# Patient Record
Sex: MALE | Race: WHITE | Employment: OTHER | URBAN - METROPOLITAN AREA
[De-identification: names, ages, dates, MRNs, and addresses within clinical notes are randomized per-mention and may not be internally consistent; named-entity substitution may affect disease eponyms.]

---

## 2019-08-07 ENCOUNTER — HOSPITAL ENCOUNTER (EMERGENCY)
Facility: CLINIC | Age: 59
Discharge: ANOTHER HOSPITAL | End: 2019-08-07
Attending: EMERGENCY MEDICINE | Admitting: EMERGENCY MEDICINE
Payer: COMMERCIAL

## 2019-08-07 ENCOUNTER — APPOINTMENT (OUTPATIENT)
Dept: GENERAL RADIOLOGY | Facility: CLINIC | Age: 59
End: 2019-08-07
Attending: EMERGENCY MEDICINE
Payer: COMMERCIAL

## 2019-08-07 ENCOUNTER — APPOINTMENT (OUTPATIENT)
Dept: CARDIOLOGY | Facility: CLINIC | Age: 59
DRG: 270 | End: 2019-08-07
Attending: INTERNAL MEDICINE
Payer: COMMERCIAL

## 2019-08-07 ENCOUNTER — SURGERY (OUTPATIENT)
Age: 59
End: 2019-08-07
Payer: COMMERCIAL

## 2019-08-07 ENCOUNTER — HOSPITAL ENCOUNTER (INPATIENT)
Facility: CLINIC | Age: 59
LOS: 3 days | Discharge: HOME | DRG: 270 | End: 2019-08-11
Admitting: INTERNAL MEDICINE
Payer: COMMERCIAL

## 2019-08-07 VITALS
DIASTOLIC BLOOD PRESSURE: 102 MMHG | TEMPERATURE: 97.8 F | WEIGHT: 210 LBS | SYSTOLIC BLOOD PRESSURE: 132 MMHG | HEART RATE: 82 BPM | RESPIRATION RATE: 39 BRPM | OXYGEN SATURATION: 100 %

## 2019-08-07 DIAGNOSIS — I21.3 ST ELEVATION MYOCARDIAL INFARCTION (STEMI), UNSPECIFIED ARTERY (H): ICD-10-CM

## 2019-08-07 DIAGNOSIS — R06.83 SNORING: ICD-10-CM

## 2019-08-07 DIAGNOSIS — I48.0 PAROXYSMAL ATRIAL FIBRILLATION (H): ICD-10-CM

## 2019-08-07 DIAGNOSIS — Z72.0 TOBACCO ABUSE: ICD-10-CM

## 2019-08-07 DIAGNOSIS — I21.9 ACUTE MYOCARDIAL INFARCTION, INITIAL EPISODE OF CARE (H): Primary | ICD-10-CM

## 2019-08-07 LAB
ANION GAP SERPL CALCULATED.3IONS-SCNC: 5 MMOL/L (ref 3–14)
APTT PPP: 25 SEC (ref 22–37)
BASOPHILS # BLD AUTO: 0.1 10E9/L (ref 0–0.2)
BASOPHILS NFR BLD AUTO: 0.4 %
BUN SERPL-MCNC: 26 MG/DL (ref 7–30)
CALCIUM SERPL-MCNC: 9.7 MG/DL (ref 8.5–10.1)
CHLORIDE SERPL-SCNC: 108 MMOL/L (ref 94–109)
CO2 SERPL-SCNC: 28 MMOL/L (ref 20–32)
CREAT SERPL-MCNC: 0.97 MG/DL (ref 0.66–1.25)
DIFFERENTIAL METHOD BLD: ABNORMAL
EOSINOPHIL # BLD AUTO: 0 10E9/L (ref 0–0.7)
EOSINOPHIL NFR BLD AUTO: 0.1 %
ERYTHROCYTE [DISTWIDTH] IN BLOOD BY AUTOMATED COUNT: 12.7 % (ref 10–15)
GFR SERPL CREATININE-BSD FRML MDRD: 84 ML/MIN/{1.73_M2}
GLUCOSE SERPL-MCNC: 106 MG/DL (ref 70–99)
HCT VFR BLD AUTO: 46.3 % (ref 40–53)
HGB BLD-MCNC: 16 G/DL (ref 13.3–17.7)
IMM GRANULOCYTES # BLD: 0 10E9/L (ref 0–0.4)
IMM GRANULOCYTES NFR BLD: 0.3 %
INR PPP: 1.08 (ref 0.86–1.14)
LYMPHOCYTES # BLD AUTO: 1.6 10E9/L (ref 0.8–5.3)
LYMPHOCYTES NFR BLD AUTO: 12.4 %
MCH RBC QN AUTO: 33.4 PG (ref 26.5–33)
MCHC RBC AUTO-ENTMCNC: 34.6 G/DL (ref 31.5–36.5)
MCV RBC AUTO: 97 FL (ref 78–100)
MONOCYTES # BLD AUTO: 0.8 10E9/L (ref 0–1.3)
MONOCYTES NFR BLD AUTO: 5.9 %
NEUTROPHILS # BLD AUTO: 10.3 10E9/L (ref 1.6–8.3)
NEUTROPHILS NFR BLD AUTO: 80.9 %
NRBC # BLD AUTO: 0 10*3/UL
NRBC BLD AUTO-RTO: 0 /100
PLATELET # BLD AUTO: 270 10E9/L (ref 150–450)
POTASSIUM SERPL-SCNC: 4.7 MMOL/L (ref 3.4–5.3)
RBC # BLD AUTO: 4.79 10E12/L (ref 4.4–5.9)
SODIUM SERPL-SCNC: 141 MMOL/L (ref 133–144)
TROPONIN I BLD-MCNC: 0.64 UG/L (ref 0–0.08)
TROPONIN I SERPL-MCNC: 1.48 UG/L (ref 0–0.04)
WBC # BLD AUTO: 12.7 10E9/L (ref 4–11)

## 2019-08-07 PROCEDURE — C9606 PERC D-E COR REVASC W AMI S: HCPCS | Mod: RC | Performed by: INTERNAL MEDICINE

## 2019-08-07 PROCEDURE — 25000128 H RX IP 250 OP 636: Performed by: INTERNAL MEDICINE

## 2019-08-07 PROCEDURE — 00000146 ZZHCL STATISTIC GLUCOSE BY METER IP

## 2019-08-07 PROCEDURE — 71045 X-RAY EXAM CHEST 1 VIEW: CPT

## 2019-08-07 PROCEDURE — 93005 ELECTROCARDIOGRAM TRACING: CPT

## 2019-08-07 PROCEDURE — 25000125 ZZHC RX 250: Performed by: INTERNAL MEDICINE

## 2019-08-07 PROCEDURE — 33967 INSERT I-AORT PERCUT DEVICE: CPT | Mod: 51 | Performed by: INTERNAL MEDICINE

## 2019-08-07 PROCEDURE — C1769 GUIDE WIRE: HCPCS | Performed by: INTERNAL MEDICINE

## 2019-08-07 PROCEDURE — 84484 ASSAY OF TROPONIN QUANT: CPT | Performed by: EMERGENCY MEDICINE

## 2019-08-07 PROCEDURE — 93458 L HRT ARTERY/VENTRICLE ANGIO: CPT | Mod: XU | Performed by: INTERNAL MEDICINE

## 2019-08-07 PROCEDURE — 99152 MOD SED SAME PHYS/QHP 5/>YRS: CPT | Performed by: INTERNAL MEDICINE

## 2019-08-07 PROCEDURE — 85730 THROMBOPLASTIN TIME PARTIAL: CPT | Performed by: EMERGENCY MEDICINE

## 2019-08-07 PROCEDURE — B2151ZZ FLUOROSCOPY OF LEFT HEART USING LOW OSMOLAR CONTRAST: ICD-10-PCS | Performed by: INTERNAL MEDICINE

## 2019-08-07 PROCEDURE — C1887 CATHETER, GUIDING: HCPCS | Performed by: INTERNAL MEDICINE

## 2019-08-07 PROCEDURE — 99291 CRITICAL CARE FIRST HOUR: CPT | Mod: 24 | Performed by: INTERNAL MEDICINE

## 2019-08-07 PROCEDURE — 33967 INSERT I-AORT PERCUT DEVICE: CPT | Performed by: INTERNAL MEDICINE

## 2019-08-07 PROCEDURE — 85025 COMPLETE CBC W/AUTO DIFF WBC: CPT | Performed by: EMERGENCY MEDICINE

## 2019-08-07 PROCEDURE — 85347 COAGULATION TIME ACTIVATED: CPT

## 2019-08-07 PROCEDURE — C1725 CATH, TRANSLUMIN NON-LASER: HCPCS | Performed by: INTERNAL MEDICINE

## 2019-08-07 PROCEDURE — 85610 PROTHROMBIN TIME: CPT | Performed by: EMERGENCY MEDICINE

## 2019-08-07 PROCEDURE — C1757 CATH, THROMBECTOMY/EMBOLECT: HCPCS | Performed by: INTERNAL MEDICINE

## 2019-08-07 PROCEDURE — 02C03ZZ EXTIRPATION OF MATTER FROM CORONARY ARTERY, ONE ARTERY, PERCUTANEOUS APPROACH: ICD-10-PCS | Performed by: INTERNAL MEDICINE

## 2019-08-07 PROCEDURE — 99285 EMERGENCY DEPT VISIT HI MDM: CPT | Mod: 25

## 2019-08-07 PROCEDURE — 25000132 ZZH RX MED GY IP 250 OP 250 PS 637: Performed by: EMERGENCY MEDICINE

## 2019-08-07 PROCEDURE — 25800030 ZZH RX IP 258 OP 636

## 2019-08-07 PROCEDURE — 93306 TTE W/DOPPLER COMPLETE: CPT | Mod: 26 | Performed by: INTERNAL MEDICINE

## 2019-08-07 PROCEDURE — B2111ZZ FLUOROSCOPY OF MULTIPLE CORONARY ARTERIES USING LOW OSMOLAR CONTRAST: ICD-10-PCS | Performed by: INTERNAL MEDICINE

## 2019-08-07 PROCEDURE — 27210794 ZZH OR GENERAL SUPPLY STERILE: Performed by: INTERNAL MEDICINE

## 2019-08-07 PROCEDURE — C1874 STENT, COATED/COV W/DEL SYS: HCPCS | Performed by: INTERNAL MEDICINE

## 2019-08-07 PROCEDURE — 25000128 H RX IP 250 OP 636: Performed by: EMERGENCY MEDICINE

## 2019-08-07 PROCEDURE — 99153 MOD SED SAME PHYS/QHP EA: CPT | Performed by: INTERNAL MEDICINE

## 2019-08-07 PROCEDURE — 80048 BASIC METABOLIC PNL TOTAL CA: CPT | Performed by: EMERGENCY MEDICINE

## 2019-08-07 PROCEDURE — 93458 L HRT ARTERY/VENTRICLE ANGIO: CPT | Mod: 26 | Performed by: INTERNAL MEDICINE

## 2019-08-07 PROCEDURE — 25800030 ZZH RX IP 258 OP 636: Performed by: INTERNAL MEDICINE

## 2019-08-07 PROCEDURE — 84484 ASSAY OF TROPONIN QUANT: CPT

## 2019-08-07 PROCEDURE — 92941 PRQ TRLML REVSC TOT OCCL AMI: CPT | Mod: RC | Performed by: INTERNAL MEDICINE

## 2019-08-07 PROCEDURE — 25000125 ZZHC RX 250

## 2019-08-07 PROCEDURE — 4A023N7 MEASUREMENT OF CARDIAC SAMPLING AND PRESSURE, LEFT HEART, PERCUTANEOUS APPROACH: ICD-10-PCS | Performed by: INTERNAL MEDICINE

## 2019-08-07 PROCEDURE — 5A02210 ASSISTANCE WITH CARDIAC OUTPUT USING BALLOON PUMP, CONTINUOUS: ICD-10-PCS | Performed by: INTERNAL MEDICINE

## 2019-08-07 PROCEDURE — 027035Z DILATION OF CORONARY ARTERY, ONE ARTERY WITH TWO DRUG-ELUTING INTRALUMINAL DEVICES, PERCUTANEOUS APPROACH: ICD-10-PCS | Performed by: INTERNAL MEDICINE

## 2019-08-07 PROCEDURE — 96365 THER/PROPH/DIAG IV INF INIT: CPT

## 2019-08-07 DEVICE — STENT RESOLUTE ONYX DE 2.7FR 3.50X38MM RONYX DE35038UX: Type: IMPLANTABLE DEVICE | Status: FUNCTIONAL

## 2019-08-07 DEVICE — STENT RESOLUTE ONYX DE 2.7FR 3.50X18MM RONYX DE35018UX: Type: IMPLANTABLE DEVICE | Status: FUNCTIONAL

## 2019-08-07 RX ORDER — ATROPINE SULFATE 0.1 MG/ML
INJECTION INTRAVENOUS
Status: DISCONTINUED | OUTPATIENT
Start: 2019-08-07 | End: 2019-08-07 | Stop reason: HOSPADM

## 2019-08-07 RX ORDER — NITROGLYCERIN 5 MG/ML
VIAL (ML) INTRAVENOUS
Status: DISCONTINUED | OUTPATIENT
Start: 2019-08-07 | End: 2019-08-07 | Stop reason: HOSPADM

## 2019-08-07 RX ORDER — FENTANYL CITRATE 50 UG/ML
INJECTION, SOLUTION INTRAMUSCULAR; INTRAVENOUS
Status: DISCONTINUED | OUTPATIENT
Start: 2019-08-07 | End: 2019-08-07 | Stop reason: HOSPADM

## 2019-08-07 RX ORDER — NOREPINEPHRINE BITARTRATE 0.06 MG/ML
0.03-0.4 INJECTION, SOLUTION INTRAVENOUS CONTINUOUS
Status: DISCONTINUED | OUTPATIENT
Start: 2019-08-07 | End: 2019-08-08

## 2019-08-07 RX ORDER — ASPIRIN 81 MG/1
324 TABLET, CHEWABLE ORAL ONCE
Status: DISCONTINUED | OUTPATIENT
Start: 2019-08-07 | End: 2019-08-07

## 2019-08-07 RX ORDER — IOPAMIDOL 755 MG/ML
INJECTION, SOLUTION INTRAVASCULAR
Status: DISCONTINUED | OUTPATIENT
Start: 2019-08-07 | End: 2019-08-07 | Stop reason: HOSPADM

## 2019-08-07 RX ORDER — DOPAMINE HYDROCHLORIDE 160 MG/100ML
5-20 INJECTION, SOLUTION INTRAVENOUS CONTINUOUS
Status: DISCONTINUED | OUTPATIENT
Start: 2019-08-07 | End: 2019-08-10

## 2019-08-07 RX ORDER — HEPARIN SODIUM 1000 [USP'U]/ML
INJECTION, SOLUTION INTRAVENOUS; SUBCUTANEOUS
Status: DISCONTINUED | OUTPATIENT
Start: 2019-08-07 | End: 2019-08-07 | Stop reason: HOSPADM

## 2019-08-07 RX ORDER — NOREPINEPHRINE BITARTRATE 0.06 MG/ML
0.03-0.4 INJECTION, SOLUTION INTRAVENOUS CONTINUOUS
Status: DISCONTINUED | OUTPATIENT
Start: 2019-08-07 | End: 2019-08-10

## 2019-08-07 RX ORDER — ASPIRIN 81 MG/1
324 TABLET, CHEWABLE ORAL ONCE
Status: COMPLETED | OUTPATIENT
Start: 2019-08-07 | End: 2019-08-07

## 2019-08-07 RX ADMIN — FENTANYL CITRATE 50 MCG: 50 INJECTION, SOLUTION INTRAMUSCULAR; INTRAVENOUS at 21:51

## 2019-08-07 RX ADMIN — ASPIRIN 81 MG 324 MG: 81 TABLET ORAL at 20:56

## 2019-08-07 RX ADMIN — TICAGRELOR 180 MG: 90 TABLET ORAL at 21:04

## 2019-08-07 RX ADMIN — HEPARIN SODIUM 8000 UNITS: 1000 INJECTION, SOLUTION INTRAVENOUS; SUBCUTANEOUS at 22:00

## 2019-08-07 RX ADMIN — MIDAZOLAM 1 MG: 1 INJECTION INTRAMUSCULAR; INTRAVENOUS at 21:50

## 2019-08-07 RX ADMIN — HEPARIN SODIUM 3000 UNITS: 1000 INJECTION, SOLUTION INTRAVENOUS; SUBCUTANEOUS at 22:45

## 2019-08-07 RX ADMIN — NOREPINEPHRINE BITARTRATE 0.03 MCG/KG/MIN: 1 INJECTION INTRAVENOUS at 23:05

## 2019-08-07 RX ADMIN — FENTANYL CITRATE 50 MCG: 50 INJECTION, SOLUTION INTRAMUSCULAR; INTRAVENOUS at 21:43

## 2019-08-07 RX ADMIN — SODIUM CHLORIDE 1000 ML: 9 INJECTION, SOLUTION INTRAVENOUS at 22:23

## 2019-08-07 RX ADMIN — AMIODARONE HYDROCHLORIDE 150 MG: 1.5 INJECTION, SOLUTION INTRAVENOUS at 22:14

## 2019-08-07 RX ADMIN — SODIUM CHLORIDE 1000 ML: 9 INJECTION, SOLUTION INTRAVENOUS at 23:32

## 2019-08-07 RX ADMIN — ATROPINE SULFATE 0.5 MG: 0.1 INJECTION, SOLUTION ENDOTRACHEAL; INTRAMUSCULAR; INTRAVENOUS; SUBCUTANEOUS at 22:12

## 2019-08-07 RX ADMIN — MIDAZOLAM 1 MG: 1 INJECTION INTRAMUSCULAR; INTRAVENOUS at 21:43

## 2019-08-07 RX ADMIN — HEPARIN SODIUM 6500 UNITS: 1000 INJECTION INTRAVENOUS; SUBCUTANEOUS at 21:04

## 2019-08-07 RX ADMIN — LIDOCAINE HYDROCHLORIDE 9 ML: 10 INJECTION, SOLUTION EPIDURAL; INFILTRATION; INTRACAUDAL; PERINEURAL at 21:46

## 2019-08-07 RX ADMIN — AMIODARONE HYDROCHLORIDE 1 MG/MIN: 50 INJECTION, SOLUTION INTRAVENOUS at 22:51

## 2019-08-07 RX ADMIN — IOPAMIDOL 300 ML: 755 INJECTION, SOLUTION INTRAVENOUS at 23:29

## 2019-08-07 RX ADMIN — DOPAMINE HYDROCHLORIDE 5 MCG/KG/MIN: 160 INJECTION, SOLUTION INTRAVENOUS at 22:33

## 2019-08-07 RX ADMIN — MIDAZOLAM 1 MG: 1 INJECTION INTRAMUSCULAR; INTRAVENOUS at 21:57

## 2019-08-07 RX ADMIN — SODIUM CHLORIDE 1000 ML: 9 INJECTION, SOLUTION INTRAVENOUS at 22:52

## 2019-08-07 ASSESSMENT — ENCOUNTER SYMPTOMS: HEADACHES: 1

## 2019-08-07 NOTE — Clinical Note
Max pressure = 17 neri. Total duration = 20 seconds. Balloon reinflated a second time: Max pressure = 17 neri. Total duration = 17 seconds. Balloon reinflated a third time: Max pressure = 17 neri. Total duration = 15 seconds. Balloon reinflated a fourth time: Max pressure = 20 neri. Total duration = 15 seconds. Balloon reinflated a fifth time: Max pressure = 20 neri. Total duration = 17 seconds.

## 2019-08-07 NOTE — Clinical Note
The first balloon was inserted into the right coronary artery and middle right coronary artery.Max pressure = 14 neri. Total duration = 17 seconds.     Max pressure = 14 neri. Total duration = 16 seconds.    Balloon reinflated a second time: Max pressure = 14 neri. Total duration = 16 seconds.  Balloon reinflated a third time: Max pressure = 14 neri. Total duration = 40 seconds.

## 2019-08-07 NOTE — Clinical Note
Max pressure = 10 neri. Total duration = 35 seconds. Balloon reinflated a second time: Max pressure = 12 neri. Total duration = 30 seconds.

## 2019-08-07 NOTE — Clinical Note
Stent deployed in the middle right coronary artery. Max pressure = 9 neri. Total duration = 30 seconds. Balloon reinflated a second time: Max pressure = 10 neri. Total duration = 13 seconds.

## 2019-08-08 PROBLEM — R57.0 CARDIOGENIC SHOCK (H): Status: ACTIVE | Noted: 2019-08-08

## 2019-08-08 LAB
ANION GAP SERPL CALCULATED.3IONS-SCNC: 6 MMOL/L (ref 3–14)
BUN SERPL-MCNC: 21 MG/DL (ref 7–30)
CALCIUM SERPL-MCNC: 8.6 MG/DL (ref 8.5–10.1)
CATH EF ESTIMATED: 25 %
CHLORIDE SERPL-SCNC: 111 MMOL/L (ref 94–109)
CHOLEST SERPL-MCNC: 144 MG/DL
CO2 SERPL-SCNC: 24 MMOL/L (ref 20–32)
CREAT SERPL-MCNC: 0.89 MG/DL (ref 0.66–1.25)
ERYTHROCYTE [DISTWIDTH] IN BLOOD BY AUTOMATED COUNT: 13.3 % (ref 10–15)
GFR SERPL CREATININE-BSD FRML MDRD: >90 ML/MIN/{1.73_M2}
GLUCOSE BLDC GLUCOMTR-MCNC: 100 MG/DL (ref 70–99)
GLUCOSE BLDC GLUCOMTR-MCNC: 102 MG/DL (ref 70–99)
GLUCOSE BLDC GLUCOMTR-MCNC: 104 MG/DL (ref 70–99)
GLUCOSE BLDC GLUCOMTR-MCNC: 105 MG/DL (ref 70–99)
GLUCOSE SERPL-MCNC: 115 MG/DL (ref 70–99)
HCT VFR BLD AUTO: 40.3 % (ref 40–53)
HDLC SERPL-MCNC: 45 MG/DL
HGB BLD-MCNC: 13.7 G/DL (ref 13.3–17.7)
INTERPRETATION ECG - MUSE: NORMAL
KCT BLD-ACNC: 199 SEC (ref 75–150)
KCT BLD-ACNC: 255 SEC (ref 75–150)
KCT BLD-ACNC: 280 SEC (ref 75–150)
LDLC SERPL CALC-MCNC: 92 MG/DL
LMWH PPP CHRO-ACNC: 0.28 IU/ML
LMWH PPP CHRO-ACNC: 0.67 IU/ML
MAGNESIUM SERPL-MCNC: 2 MG/DL (ref 1.6–2.3)
MAGNESIUM SERPL-MCNC: 2 MG/DL (ref 1.6–2.3)
MCH RBC QN AUTO: 33.1 PG (ref 26.5–33)
MCHC RBC AUTO-ENTMCNC: 34 G/DL (ref 31.5–36.5)
MCV RBC AUTO: 97 FL (ref 78–100)
NONHDLC SERPL-MCNC: 99 MG/DL
PHOSPHATE SERPL-MCNC: 4.2 MG/DL (ref 2.5–4.5)
PLATELET # BLD AUTO: 218 10E9/L (ref 150–450)
POTASSIUM SERPL-SCNC: 4.6 MMOL/L (ref 3.4–5.3)
RBC # BLD AUTO: 4.14 10E12/L (ref 4.4–5.9)
SODIUM SERPL-SCNC: 141 MMOL/L (ref 133–144)
TRIGL SERPL-MCNC: 33 MG/DL
TROPONIN I SERPL-MCNC: 15.63 UG/L (ref 0–0.04)
TROPONIN I SERPL-MCNC: 54.76 UG/L (ref 0–0.04)
TROPONIN I SERPL-MCNC: 71.03 UG/L (ref 0–0.04)
TROPONIN I SERPL-MCNC: 85.98 UG/L (ref 0–0.04)
WBC # BLD AUTO: 12.8 10E9/L (ref 4–11)

## 2019-08-08 PROCEDURE — 80061 LIPID PANEL: CPT | Performed by: INTERNAL MEDICINE

## 2019-08-08 PROCEDURE — 84484 ASSAY OF TROPONIN QUANT: CPT | Performed by: NURSE PRACTITIONER

## 2019-08-08 PROCEDURE — 25000132 ZZH RX MED GY IP 250 OP 250 PS 637: Performed by: INTERNAL MEDICINE

## 2019-08-08 PROCEDURE — 25000128 H RX IP 250 OP 636: Performed by: INTERNAL MEDICINE

## 2019-08-08 PROCEDURE — 25800030 ZZH RX IP 258 OP 636: Performed by: INTERNAL MEDICINE

## 2019-08-08 PROCEDURE — 00000146 ZZHCL STATISTIC GLUCOSE BY METER IP

## 2019-08-08 PROCEDURE — 84100 ASSAY OF PHOSPHORUS: CPT | Performed by: INTERNAL MEDICINE

## 2019-08-08 PROCEDURE — 99291 CRITICAL CARE FIRST HOUR: CPT | Performed by: PHYSICIAN ASSISTANT

## 2019-08-08 PROCEDURE — 84484 ASSAY OF TROPONIN QUANT: CPT | Performed by: INTERNAL MEDICINE

## 2019-08-08 PROCEDURE — 85520 HEPARIN ASSAY: CPT | Performed by: INTERNAL MEDICINE

## 2019-08-08 PROCEDURE — 85027 COMPLETE CBC AUTOMATED: CPT | Performed by: INTERNAL MEDICINE

## 2019-08-08 PROCEDURE — 85520 HEPARIN ASSAY: CPT

## 2019-08-08 PROCEDURE — 93005 ELECTROCARDIOGRAM TRACING: CPT

## 2019-08-08 PROCEDURE — 83735 ASSAY OF MAGNESIUM: CPT | Performed by: INTERNAL MEDICINE

## 2019-08-08 PROCEDURE — 93010 ELECTROCARDIOGRAM REPORT: CPT | Performed by: INTERNAL MEDICINE

## 2019-08-08 PROCEDURE — 99207 ZZC NO CHARGE LOS: CPT | Performed by: INTERNAL MEDICINE

## 2019-08-08 PROCEDURE — 25000128 H RX IP 250 OP 636

## 2019-08-08 PROCEDURE — 99291 CRITICAL CARE FIRST HOUR: CPT | Performed by: INTERNAL MEDICINE

## 2019-08-08 PROCEDURE — 20000003 ZZH R&B ICU

## 2019-08-08 PROCEDURE — 80048 BASIC METABOLIC PNL TOTAL CA: CPT | Performed by: INTERNAL MEDICINE

## 2019-08-08 RX ORDER — ATORVASTATIN CALCIUM 40 MG/1
40 TABLET, FILM COATED ORAL DAILY
Status: DISCONTINUED | OUTPATIENT
Start: 2019-08-08 | End: 2019-08-09

## 2019-08-08 RX ORDER — MULTIPLE VITAMINS W/ MINERALS TAB 9MG-400MCG
1 TAB ORAL DAILY
COMMUNITY

## 2019-08-08 RX ORDER — CYCLOBENZAPRINE HCL 5 MG
5 TABLET ORAL AT BEDTIME
Status: COMPLETED | OUTPATIENT
Start: 2019-08-08 | End: 2019-08-08

## 2019-08-08 RX ORDER — ACETAMINOPHEN 325 MG/1
650 TABLET ORAL EVERY 4 HOURS PRN
Status: DISCONTINUED | OUTPATIENT
Start: 2019-08-08 | End: 2019-08-09

## 2019-08-08 RX ORDER — NALOXONE HYDROCHLORIDE 0.4 MG/ML
.1-.4 INJECTION, SOLUTION INTRAMUSCULAR; INTRAVENOUS; SUBCUTANEOUS
Status: DISCONTINUED | OUTPATIENT
Start: 2019-08-08 | End: 2019-08-11 | Stop reason: HOSPADM

## 2019-08-08 RX ORDER — FENTANYL CITRATE 50 UG/ML
25-50 INJECTION, SOLUTION INTRAMUSCULAR; INTRAVENOUS
Status: ACTIVE | OUTPATIENT
Start: 2019-08-08 | End: 2019-08-08

## 2019-08-08 RX ORDER — ATROPINE SULFATE 0.1 MG/ML
0.5 INJECTION INTRAVENOUS EVERY 5 MIN PRN
Status: ACTIVE | OUTPATIENT
Start: 2019-08-08 | End: 2019-08-08

## 2019-08-08 RX ORDER — ASPIRIN 81 MG/1
81 TABLET ORAL DAILY
Status: DISCONTINUED | OUTPATIENT
Start: 2019-08-08 | End: 2019-08-11 | Stop reason: HOSPADM

## 2019-08-08 RX ORDER — IBUPROFEN 200 MG
400-600 TABLET ORAL EVERY 4 HOURS PRN
Status: ON HOLD | COMMUNITY
End: 2019-08-10

## 2019-08-08 RX ORDER — HYDROCODONE BITARTRATE AND ACETAMINOPHEN 5; 325 MG/1; MG/1
1-2 TABLET ORAL EVERY 4 HOURS PRN
Status: DISCONTINUED | OUTPATIENT
Start: 2019-08-08 | End: 2019-08-11 | Stop reason: HOSPADM

## 2019-08-08 RX ORDER — SODIUM CHLORIDE 9 MG/ML
INJECTION, SOLUTION INTRAVENOUS CONTINUOUS
Status: DISCONTINUED | OUTPATIENT
Start: 2019-08-08 | End: 2019-08-10

## 2019-08-08 RX ORDER — SODIUM CHLORIDE 9 MG/ML
INJECTION, SOLUTION INTRAVENOUS CONTINUOUS
Status: ACTIVE | OUTPATIENT
Start: 2019-08-08 | End: 2019-08-08

## 2019-08-08 RX ORDER — NALOXONE HYDROCHLORIDE 0.4 MG/ML
.2-.4 INJECTION, SOLUTION INTRAMUSCULAR; INTRAVENOUS; SUBCUTANEOUS
Status: ACTIVE | OUTPATIENT
Start: 2019-08-08 | End: 2019-08-08

## 2019-08-08 RX ORDER — FLUMAZENIL 0.1 MG/ML
0.2 INJECTION, SOLUTION INTRAVENOUS
Status: ACTIVE | OUTPATIENT
Start: 2019-08-08 | End: 2019-08-08

## 2019-08-08 RX ORDER — NITROGLYCERIN 0.4 MG/1
0.4 TABLET SUBLINGUAL EVERY 5 MIN PRN
Status: DISCONTINUED | OUTPATIENT
Start: 2019-08-08 | End: 2019-08-11 | Stop reason: HOSPADM

## 2019-08-08 RX ADMIN — AMIODARONE HYDROCHLORIDE 0.5 MG/MIN: 50 INJECTION, SOLUTION INTRAVENOUS at 17:17

## 2019-08-08 RX ADMIN — HYDROCODONE BITARTRATE AND ACETAMINOPHEN 1 TABLET: 5; 325 TABLET ORAL at 00:33

## 2019-08-08 RX ADMIN — ASPIRIN 81 MG: 81 TABLET, COATED ORAL at 09:16

## 2019-08-08 RX ADMIN — HEPARIN SODIUM 1050 UNITS/HR: 10000 INJECTION, SOLUTION INTRAVENOUS at 01:16

## 2019-08-08 RX ADMIN — ATORVASTATIN CALCIUM 40 MG: 40 TABLET, FILM COATED ORAL at 09:16

## 2019-08-08 RX ADMIN — TICAGRELOR 90 MG: 90 TABLET ORAL at 20:38

## 2019-08-08 RX ADMIN — HYDROCODONE BITARTRATE AND ACETAMINOPHEN 1 TABLET: 5; 325 TABLET ORAL at 01:51

## 2019-08-08 RX ADMIN — HYDROCODONE BITARTRATE AND ACETAMINOPHEN 1 TABLET: 5; 325 TABLET ORAL at 19:30

## 2019-08-08 RX ADMIN — HYDROCODONE BITARTRATE AND ACETAMINOPHEN 1 TABLET: 5; 325 TABLET ORAL at 18:41

## 2019-08-08 RX ADMIN — HYDROCODONE BITARTRATE AND ACETAMINOPHEN 1 TABLET: 5; 325 TABLET ORAL at 22:52

## 2019-08-08 RX ADMIN — CYCLOBENZAPRINE HYDROCHLORIDE 5 MG: 5 TABLET, FILM COATED ORAL at 23:58

## 2019-08-08 RX ADMIN — SODIUM CHLORIDE: 9 INJECTION, SOLUTION INTRAVENOUS at 01:17

## 2019-08-08 RX ADMIN — AMIODARONE HYDROCHLORIDE 0.5 MG/MIN: 50 INJECTION, SOLUTION INTRAVENOUS at 09:42

## 2019-08-08 RX ADMIN — HYDROCODONE BITARTRATE AND ACETAMINOPHEN 1 TABLET: 5; 325 TABLET ORAL at 13:39

## 2019-08-08 RX ADMIN — AMIODARONE HYDROCHLORIDE 150 MG: 1.5 INJECTION, SOLUTION INTRAVENOUS at 00:32

## 2019-08-08 RX ADMIN — HYDROCODONE BITARTRATE AND ACETAMINOPHEN 1 TABLET: 5; 325 TABLET ORAL at 05:14

## 2019-08-08 ASSESSMENT — ACTIVITIES OF DAILY LIVING (ADL)
ADLS_ACUITY_SCORE: 11
ADLS_ACUITY_SCORE: 11
ADLS_ACUITY_SCORE: 17
ADLS_ACUITY_SCORE: 11
ADLS_ACUITY_SCORE: 11

## 2019-08-08 NOTE — PHARMACY-ADMISSION MEDICATION HISTORY
Admission medication history interview status for the 8/7/2019  admission is complete. See EPIC admission navigator for prior to admission medications     Medication history source reliability:Good    Actions taken by pharmacist (provider contacted, etc):None     Additional medication history information not noted on PTA med list :None    Medication reconciliation/reorder completed by provider prior to medication history? No    Time spent in this activity: 5    Prior to Admission medications    Medication Sig Last Dose Taking? Auth Provider   ibuprofen (ADVIL/MOTRIN) 200 MG tablet Take 400-600 mg by mouth every 4 hours as needed for mild pain 8/7/2019 at Unknown time Yes Unknown, Entered By History   multivitamin w/minerals (MULTI-VITAMIN) tablet Take 1 tablet by mouth daily 8/7/2019 at Unknown time Yes Unknown, Entered By History

## 2019-08-08 NOTE — PROGRESS NOTES
SURGICAL ICU PROGRESS NOTE  August 8, 2019      ASSESSMENT: Gilbert Dawson is a 58 year old male with history congenital heart disease, pulmonary stenosis and VSD that presented with angina and was found to have an acute STEMI.  He was emergently take to angio for thrombectomy and stenting in two locations.  He was referred to ICU for cardiogenic shock.      PLAN:  Neuro/ pain/ sedation:  - Pain well-controlled and minimal.  Anginal pain is resolved.    - Fentanyl, dilaudid prn.      Pulmonary care:   - Supplemental oxygen to keep saturation above 92 %.  - Incentive spirometer every 15- 30 minutes when awake.    Cardiovascular:    - STEMI s/p stent X 2 currently on IABP.    - Cardiogenic shock-  - Atrial fibrillation in the setting STEMI  - Ischemic cardiomyopathy with EF of 30%.    - Vtach in the setting STEMI, intermittent/asymptomatic.  - Cardiac meds per Cardiology (statin, aspirin, ticagrelor, heparin)  - Continue pressor support.  - Amiodarone drip.    - Serial EKGs per Cardiology.  - Repeat echos per Cardiology.    - Monitor hemodynamic status.   - optimize electrolytes as needed.      GI care/Nutrition:  - Clear liquids.        Renal/ Fluid Balance/Electrolytes:    - Urine output is adequate so far.  - Will continue to monitor intake and output.    Endocrine:    - No management indication.   - Sliding scale for diabetes management.     ID/ Antibiotics:  - No indication for antibiotics.     Heme:     - Hemoglobin stable. Minimal intraoperative blood loss.    - Monitor H/H.      Prophylaxis:    - On heparin drip.    - No GI prophylaxis needed.      Lines/ tubes/ drains:  - PIV  - Right groin procedural access    Disposition:  - [facility] ICU    Patient seen, findings and plan discussed with surgical ICU staff, Dr. Kelsey.  Critical care time: 50 minutes    Valentina Hughes PA-C    ====================================    SUBJECTIVE:   Patient states he is tired but feeling well.  He denies any shortness of  breath.  His anginal symptoms have resolved and he has minimal discomfort at his right femoral site.  He is mostly bothered by having to lay flat and move minimally.  10 point ROS otherwise negative.      OBJECTIVE:   1. VITAL SIGNS:   Temp:  [97.8  F (36.6  C)-97.9  F (36.6  C)] 97.9  F (36.6  C)  Pulse:  [66-95] 80  Heart Rate:  [71-99] 75  Resp:  [9-39] 16  BP: ()/() 109/86  SpO2:  [88 %-100 %] 99 %  Resp: 16      2. INTAKE/ OUTPUT:   I/O last 3 completed shifts:  In: 1228.13 [P.O.:200; I.V.:1028.13]  Out: 825 [Urine:825]    3. PHYSICAL EXAMINATION:     GEN: NAD, lying comfortably in bed.    EYES: PERRL, Anicteric sclera.   HEENT:  Normocephalic, atraumatic, trachea midline  CV: IRIR, no gallops, rubs, or murmurs  PULM/CHEST: Clear breath sounds bilaterally without rhonchi, crackles or wheeze, symmetric chest rise  GI: normal bowel sounds, soft, non-tender, no rebound tenderness or guarding, no masses  : No hancock.  EXTREMITIES: No peripheral edema, moving all extremities, peripheral pulses intact  NEURO: Cranial nerves II-XII grossly intact, no motor-sensory deficits noted  SKIN: No rashes, sores or ulcerations  PSYCH:  Affect: appropriate    Imaging personally reviewed:  ECG  4. INVESTIGATIONS:   Arterial Blood Gases   No lab results found in last 7 days.  Complete Blood Count   Recent Labs   Lab 08/08/19 0600 08/07/19  2100   WBC 12.8* 12.7*   HGB 13.7 16.0    270     Basic Metabolic Panel  Recent Labs   Lab 08/08/19  0600 08/07/19  2100    141   POTASSIUM 4.6 4.7   CHLORIDE 111* 108   CO2 24 28   BUN 21 26   CR 0.89 0.97   * 106*     Liver Function Tests  Recent Labs   Lab 08/07/19  2100   INR 1.08     Pancreatic Enzymes  No lab results found in last 7 days.  Coagulation Profile  Recent Labs   Lab 08/07/19  2100   INR 1.08   PTT 25         5. RADIOLOGY:   Recent Results (from the past 24 hour(s))   XR Chest Port 1 View    Narrative    EXAM: XR CHEST PORT 1 VW  LOCATION:  [facility]  DATE/TIME: 8/7/2019 8:57 PM    INDICATION: Myocardial infarction  COMPARISON: None    FINDINGS: Negative chest.       =========================================

## 2019-08-08 NOTE — CONSULTS
Critical Care  Note      08/08/2019    Name: Gilbert Dawson MRN#: [id number]   Age: 58 year old YOB: 1960     \A [facility]ology of [place] [facility]\""tl Day# 0  ICU DAY #    MV DAY #             Problem List:   Active Problems:    Cardiogenic shock (H)  1. Acute MI- per cardiology/Dr. Leong this was his RCA (now with stents), and toring closely, and also supported with IABP.  2. Cardiogenic shock, slowly recovering with support  (especially IABP)- he is now slowly coming off of support (including levophed) though still on some amiodarone to prevent vtach.   3. History of congenital heart disease- pulmonary stenosis and VSD  4. RBBB  Overall, acute and complicated, but compensated          Summary/Hospital Course:           Assessment and plan :     Gilbert Dawson IS a 58 year old male admitted on 8/7/2019 for acute MI .   I have personally reviewed the daily labs, imaging studies, cultures and discussed the case with referring physician and consulting physicians.     My assessment and plan by system for this patient is as follows:    Neurology/Psychiatry:   1. Alert and cogent     Cardiovascular:   1.Hemodynamics - compensated though still with IABP and amiodarone    Pulmonary/Ventilator Management:   1. compnesated    GI and Nutrition :   1. PO's as able      Renal/Fluids/Electrolytes:   1. He remains on medications s    Infectious Disease:   1. No issues at this time     Endocrine:   1. IV insulin  As needed    Hematology/Oncology:   1. Monitor only dd     IV/Access:   1. Venous access -   2. Arterial access -   3.  Plan  - central access required and necessary      ICU Prophylaxis:   1. DVT: Hep Subq/ LMWH/mechanical  2. VAP: HOB 30 degrees, chlorhexidine rinse  3. Stress Ulcer: PPI/H2 blocker  4. Restraints: Nonviolent soft two point restraints required and necessary for patient safety and continued cares and good effect as patient continues to pull at necessary lines, tubes despite education and distraction. Will readdress  daily.   5. Wound care  -   6. Feeding - PO's as able   7. Family Update: I discussed with wife   8. Disposition - continue ICU     Key goals for next 24 hours:   1. Support and stabilize  2. Titrate down medication support as able  3.               Medical History:     No past medical history on file.  No past surgical history on file.  Social History     Socioeconomic History     Marital status:      Spouse name: Not on file     Number of children: Not on file     Years of education: Not on file     Highest education level: Not on file   Occupational History     Not on file   Social Needs     Financial resource strain: Not on file     Food insecurity:     Worry: Not on file     Inability: Not on file     Transportation needs:     Medical: Not on file     Non-medical: Not on file   Tobacco Use     Smoking status: Not on file   Substance and Sexual Activity     Alcohol use: Not on file     Drug use: Not on file     Sexual activity: Not on file   Lifestyle     Physical activity:     Days per week: Not on file     Minutes per session: Not on file     Stress: Not on file   Relationships     Social connections:     Talks on phone: Not on file     Gets together: Not on file     Attends [employer] service: Not on file     Active member of club or organization: Not on file     Attends meetings of clubs or organizations: Not on file     Relationship status: Not on file     Intimate partner violence:     Fear of current or ex partner: Not on file     Emotionally abused: Not on file     Physically abused: Not on file     Forced sexual activity: Not on file   Other Topics Concern     Not on file   Social History Narrative     Not on file      No Known Allergies           Key Medications:       amiodarone  150 mg Intravenous Once     aspirin  81 mg Oral Daily     atorvastatin  40 mg Oral Daily     ticagrelor  90 mg Oral Q12H       amiodarone 1 mg/min (08/07/19 2251)     amiodarone       DOPamine Stopped (08/07/19 2246)      HEParin       norepinephrine       Percutaneous Coronary Intervention orders placed (this is information for BPA alerting)       ACE/ARB/ARNI NOT PRESCRIBED       BETA BLOCKER NOT PRESCRIBED       sodium chloride          Home Meds    Current Facility-Administered Medications on File Prior to Encounter:  [COMPLETED] aspirin (ASA) chewable tablet 324 mg   [COMPLETED] heparin (porcine) injection (LOADING DOSE)   [COMPLETED] ticagrelor (BRILINTA) tablet 180 mg     No current outpatient medications on file prior to encounter.           Physical Examination:   Temp:  [97.8  F (36.6  C)] 97.8  F (36.6  C)  Pulse:  [79-93] 93  Heart Rate:  [79-92] 86  Resp:  [9-39] 16  BP: (103-146)/() 110/79  SpO2:  [88 %-100 %] 100 %  No intake or output data in the 24 hours ending 08/08/19 0105  Wt Readings from Last 4 Encounters:   08/07/19 95.3 kg (210 lb)     BP - Mean:  [] 90  CVP:  [14 mmHg-15 mmHg] 14 mmHg  Resp: 16    No lab results found in last 7 days.    GEN: no acute distress; comfortable in bed with  IABP in place in    HEENT: head ncat, sclera anicteric, OP patent, trachea midline   PULM: unlabored synchronous with vent, clear anteriorly    CV/COR: RRR S1S2 no gallop,  No rub, no murmur  ABD: soft nontender, hypoactive bowel sounds, no mass  EXT:  Trace edema   warm  NEURO: grossly intact  SKIN: no obvious rash; no cyanosis or mottling; he has a good tan    LINES: clean, dry intact         Data:   All data and imaging reviewed     ROUTINE ICU LABS (Last four results)  CMP  Recent Labs   Lab 08/07/19  2100      POTASSIUM 4.7   CHLORIDE 108   CO2 28   ANIONGAP 5   *   BUN 26   CR 0.97   GFRESTIMATED 84   GFRESTBLACK >90   JOSE JUAN 9.7     CBC  Recent Labs   Lab 08/07/19  2100   WBC 12.7*   RBC 4.79   HGB 16.0   HCT 46.3   MCV 97   MCH 33.4*   MCHC 34.6   RDW 12.7        INR  Recent Labs   Lab 08/07/19  2100   INR 1.08     Arterial Blood GasNo lab results found in last 7 days.    All  cultures:  No results for input(s): CULT in the last 168 hours.  Recent Results (from the past 24 hour(s))   XR Chest Port 1 View    Narrative    EXAM: XR CHEST PORT 1 VW  LOCATION: [facility]  DATE/TIME: 8/7/2019 8:57 PM    INDICATION: Myocardial infarction  COMPARISON: None    FINDINGS: Negative chest.         MD Jane    Billing: This patient is critically ill: Yes. Total critical care time today 35 min.

## 2019-08-08 NOTE — CONSULTS
Consult Date:  08/08/2019      HISTORY OF PRESENT ILLNESS:  This is an Emergency Cardiac Consultation.  This patient is critically ill and his survival was questionable when I met him.  This dictation is being done post-angiogram and angioplasty.      The history initially presented was not correct.  The real history is that this patient has a history of congenital heart disease with a small muscular VSD and what is probably only mild to mildly- moderate pulmonary valve stenosis.  He had an echo in 2016 at, I believe [place], and he was supposed to follow up with a cardiologist after that, but he never did it.  Sometime in retrospect, around that time, he also had some chest pain, but he is a magallanes, so he passed it off.  He has been doctoring, for a year or more, for neck and shoulder pain and he has been diagnosed with some type of shoulder ailment, but I am a little suspicious that in retrospect might have been angina, although I do not have to admit, he states that the shoulder pain only occurs at rest, not when he is out and active.  In retrospect last night, however, he had more of that shoulder pain and went into his jaw and then into his chest and he was sweaty.  It went away and he assumed it was nothing, or it was just aches and pains from being a magallanes, and then today at 5:00 p.m., he had the same onset of symptoms, but this time, it did not go away.  He again thought it was just aches and pains from being a magallanes, so he did not present to [facility] for approximately 4 more hours.  At [facility], the EKG was markedly abnormal and he was transferred to our hospital.  On presentation, he is actively having pain.  He has marked EKG changes.  He was in sinus rhythm, but then had very, very frequent PACs, and then went into atrial fib before we did much, and then he started having multiple, multiple salvos of nonsustained V-tach.  We did an emergency angiogram and the reader is  referred to the angiogram report, but basically, what we found was the LAD was occluded at the origin and it looked chronic.  The diagonal was a very large vessel, occluded at the origin.  The circumflex had no more than mild to moderate disease.  The right coronary artery was the infarct acute vessel and it was thrombosed at 100% in the midportion.  It did have a conus branch LAD collateral and, as we found after we opened the RCA, the PDA collateralized the LAD also.  The RCA was severely diseased and calcified.  Again, as one reads the report what happened here, I am sure that he had a chronic LAD and diagonal occlusion and it may have been back to 4 years ago and again, as I suspect, some of that shoulder pain that has been doctoring for might have actually been his angina and then last night and then today, he closed off the right coronary completely and that provided collaterals to the LAD, so in foul swoop with 1 clot, he took off the inferior wall, the posterior wall, and the anterior wall, and a right ventricular infarction.  My initial plan was simply to balloon angioplasty the RCA and then possibly send him to surgery, but he received Brilinta and the post-balloon result was unstable.  We did thrombectomy, and I ended up stenting in 2 spots.  He had blood pressures primarily 60 systolic for much of the case, and he was in AFib for the entire case, but multiple spindles of V-tach.  We had him on low-dose dopamine, which helped his blood pressure, but the V-tach was worse.  We eventually switched it off to Levophed at a lower dose, and put an intraaortic balloon pump in.  When he left the lab, he was augmenting in the 90s.  He was in atrial fib, was only minimal.  Repeated the EKG, although still abnormal, was markedly improved from the pre-.  His arm and shoulder pain were getting distinctly better.  An emergency echo shows relatively preserved RV function, though evidence of an RV infarct is seen.  We do  not clearly see a VSD.  The LV function, roughly, is in the 30% range with anterior, inferior and posterior wall motion abnormalities.  No clear MR was seen.      PAST MEDICAL HISTORY:   1.  Cardiac as above, even though he initially told the ER doctor that he did not have any heart problems, per se.   2.  Smoking history, ongoing tobacco use.   3.  Neck, lumbar region pain, for which he saw a chiropractor recently.   4.  Left shoulder pain, for which she saw Dr. LuisA Aquino.   5.  Remote history of cellulitis of the face.   6.  VSD, pulmonary stenosis as above.   7.  Right bundle branch block.      SOCIAL HISTORY:  He is .  He is a magallanes.  He smokes a half a pack of cigarettes per day.  He has 4-5 beers per week, mostly weekend.  He has mild caffeine intake.      FAMILY HISTORY:  Father  with Alzheimer's disease.  Mother is still alive in her 90s.  There is no coronary disease in the family that he is aware of.      REVIEW OF SYSTEMS:     MUSCULOSKELETAL:  Shoulder as above, as an orthopedic problem.     PULMONARY:  No known lung disease.   CARDIAC:  As above.   GASTROINTESTINAL:  Negative.   NEUROLOGIC:  Negative for stroke.   ENDOCRINE:  Negative.   Remainder review of systems negative.      PHYSICAL EXAMINATION:   GENERAL:  Reveals a patient who is in cardiogenic shock on presentation.  Essentially, it was in rapid AFib with multiple spindles of V-tach.  Blood pressure 60 systolic.  He was in extremis condition on presentation.   SKIN:  A bit mottled, sweaty.   HEENT:  Nonicteric.   NECK:  Supple without thyromegaly.   CHEST:  A few crackles.  This exam was done very cursory on the Cath Lab table with a few crackles anteriorly and laterally.   CARDIAC:  Revealed frequent ectopy.  There was a soft systolic murmur.  There was either a click or I am hearing a pulmonary valve.  Again, it was a very quick exam and I am wondering if I am hearing a right bundle branch block and some pulmonary stenosis as  the click.  No rub.  Carotid pulses were only about 1+, femoral pulses about 1+.  Again, he is hypotensive during the exam.   ABDOMEN:  Flat.  There is no organomegaly.   EXTREMITIES:  No cyanosis, clubbing or edema.   NEUROLOGIC:  He is alert and oriented.  Cranial nerves II-XII are intact.  Motor and sensory intact.      LABORATORY DATA:  Much of which came back after we started the case:  Sodium 141, potassium 4.7, creatinine 0.97.  Troponin #1 is positive at 1.475.  Glucose 106.  White count 12.7, hemoglobin 16.0.  I do not know if that is contraction alkalosis or from the VSD, but they said the VSD was left to right and small, so it is probably not from shunting right to left, but hypoxia.  MCV is 97, platelet count 270,000.  INR 1.08.        Chest x-ray:  No pulmonary findings.      EKG #1, from [facility]:  Showed a sinus rhythm with the start of Q-waves in lead III and F, marked ST elevation in leads II, III and F, mild ST elevation in V6, marked ST depression in I, L, and V2 through V4, with a slight incomplete right bundle branch block.  There may have been, at most, trivial ST elevation in lead V1 which would perhaps go along with probably the RV infarct.      The reader is referred to the heart catheterization and as above, but basically this is a patient, as I alluded to who, in retrospect, has had a chronic LAD and diagonal occlusion, perhaps going back a few years.  I am wondering if some of his orthopedic shoulder issues are really angina.  He closed off his right coronary artery last night and then today, and since it collateralized the LAD, it took out the anterior wall and the inferior and posterior wall.  He is in cardiogenic shock but getting better.  He is in atrial fib now.  We will keep him on the IV amiodarone, which I started because of the AFib and the V-tach.  I will keep him on the low-dose Levophed, a balloon pump is in place.  We ended up stenting the right coronary artery, so he  "will need to be on Brilinta.  My ultimate plan is, if he survives and makes good recovery, we should do a viability study of the anterior wall and perhaps wait a little bit for recovery and then consider going to bypass grafting of the LAD and the large diagonal.  I do not know that the circumflex is severe enough, and the right coronary artery, assuming the stents stay patent, will not need to be bypassed.  Of course, the issue is going to be going to bypass surgery, if it is within 6 months while he is still on Plavix or similar medicine.  The RV branch is occluded and there was an RV infarct and no doubt that is contributing to a little bit of the hypotension, although again, the echo is rather reassuring and in the setting of pulmonary valve stenosis and an RV infarct, one would expect some pressure volume overload of the right ventricle and I suspect that this is \"the double whammy\" of an RV infarct and pulmonary stenosis.  I do not think the VSD is an issue, in fact, on the LV gram  I did not even see it.  Although, some of that could be because of RV pressure probably is higher today and it may have balanced out and that is why we do not see shunting from the LV gram.  We will check cholesterols.  At this point, he cannot get beta blockers or ACE inhibitors, because he is in cardiogenic shock, but slowly getting better.         RIVERA ARREGUIN MD             D: 2019   T: 2019   MT: SULTANA      Name:     ENRIQUETA KLEIN   MRN:      [id number]        Account:       [id number]   :      1960           Consult Date:  2019      Document: [id number]       cc: Noe Rodney MD   "

## 2019-08-08 NOTE — PLAN OF CARE
Heart rhythm incredibly varied overnight with self-limiting runs of V-tach, SVT, Afib RVR, etc.  Pt never symptomatic and returned to baseline of Afib- CVR with PVCs/PACs.  Blood pressure stable with balloon pump, amiodarone gtt, and Levophed 0.03 mcg/kg/min continuous overnight.  Balloon pump stably in place overnight without issue, site without hematoma/bruit, pulses intact.  Pain in ear radiating to jaw continued and was controlled to within tolerable limits with Norco, three tabs given overnight.  Continues to maintain O2 saturations on 2 L NC.  Pt alert, calm, and cooperative overnight.  Pt is still taking time to accept the severity of this event.  Wife updated at bedside and by phone this morning.  Will continue to monitor.

## 2019-08-08 NOTE — CONSULTS
Cardiovascular Surgery Consultation     Asked to see this patient in consultation for possible surgical treatment. Primary care physician is Dr. Bharat Worley and cardiologist is Dr. Leong.           Chief Complaint:   Chest/shoulder pain.    History is obtained from the patient and wife.         History of Present Illness:   Mr. Dawson is a 58 year old male with a history of small muscular VSD (Qp/Qs 1.3)  and mild pulmonic valve stenosis (24mmHg MG)  who presented to ER with chest/shoulder/jaw pain.  He was found to have STEMI.  Pt was loaded with Brilanta and taken emergently to cath lab.  Pt was in cardiogenic shock.  He was found to have likely chronically occluded LAD/diagonal proximally with collateralization from the right.  Acute finding of RCA occlusion was noted.  Thrombectomy was performed and stentx2 which restored flow to right and left heart.  IABP was placed and pt transferred to ICU in shock but improving with vasopressor support.  This morning, he denies any chest pain and maintaining global perfusion with IABP, off vasopressors.    Left cath 8/7/2019:    The ejection fraction is 20-30% by visual estimate.    Left ventricular filling pressures are severely elevated .    Dist LM lesion is 20% stenosed.    Ost LAD lesion is 100% stenosed.    Ost 1st Diag lesion is 100% stenosed.    Mid Cx lesion is 45% stenosed.    Prox RCA lesion is 60% stenosed.    Mid RCA lesion is 100% stenosed.    Acute Mrg lesion is 100% stenosed.              Past Medical History:   I have reviewed this patient's past medical history  Diverticulosis without bleeding episodes          Past Surgical History:   This patient has no significant past surgical history            Social History:     Social History     Tobacco Use     Smoking status: Not on file   Substance Use Topics     Alcohol use: Not on file             Family History:   I have reviewed and updated this patient's family history          Immunizations:   No  immunizations have been given to this patient          Allergies:   All allergies reviewed and addressed          Medications:     No current outpatient medications on file.             Review of Systems:   Gen: denies frequent headaches, double/blurry vision, insomnia, fatigue, unexplained weight loss/gain. No previous anesthesia reactions.  CV: denies chest pain, shortness of breath, peripheral edema.  Pulm: denies shortness of breath, asthma or wheezing  GI/: denies liver or kidney problems, blood in stool or BRBPR, difficulty urinating  Endo: denies thyroid problems or Diabetes  Heme/Onc: denies bleeding or clotting disorders, no family problems with bleeding/clotting diorders  MS: no weakness, tremors or gait instability  Neuro: denies depression, memory problems, no dysesthesias, no previous strokes, no migraines, no dysphagia  Skin: No petechiae, purpura or rash.            Physical Exam:    B/P: 106/95, P: 77,    Wt Readings from Last 2 Encounters:   08/08/19 96.3 kg (212 lb 4.9 oz)   08/07/19 95.3 kg (210 lb)     General: NAD, comfortable in bed, wife at bedside  CV: atrial fibrillation, rate controlled, IABP in place  Pulm: breathing comfortably, no audible wheezing  GI: soft, ND  MS: moves all extremities x 4, 5+/5+ equal strength bilaterally  Neuro: pupils equal round and reactive to light, cranial nerves, II-XII grossly intact, no gross neurologic deficits noted         Data:        Lab Results   Component Value Date     08/08/2019    Lab Results   Component Value Date    CHLORIDE 111 08/08/2019    Lab Results   Component Value Date    BUN 21 08/08/2019      Lab Results   Component Value Date    POTASSIUM 4.6 08/08/2019    Lab Results   Component Value Date    CO2 24 08/08/2019    Lab Results   Component Value Date    CR 0.89 08/08/2019        Lab Results   Component Value Date    WBC 12.8 (H) 08/08/2019    HGB 13.7 08/08/2019    HCT 40.3 08/08/2019    MCV 97 08/08/2019     08/08/2019      Lab Results   Component Value Date    INR 1.08 08/07/2019            Assessment and Plan:   57 yo male with history of small muscular VSD (Qp/Qs 1.3) and mild pulmonic valve stenosis (24 mmHg MG) presented with acute STEMI and cardiogenic shock.  He is s/p emergent left heart cath with thrombectomy to RCA and stent x2.  Pt was loaded with Brilinta prior to intervention.  Decision to manage patient with PCI vs surgery due to risk of bleeding.  Discussed case with Dr. Leong and Dr. Piedra on call.  Fortunately, revascularization was successful with thrombectomy and stent placement which restored perfusion to inferior wall along with collateral blood flow to LAD territory.  Agree with optimal medical management and continuing anti-platelet therapy with plans for myocardial viability test in the future.  If viable, he would be a candidate for surgical revascularization to LAD and diag.  Will repeat ECHO as outpatient to reevaluate muscular VSD as well.      Will discuss with Dr. Moore.    Fior Casas  Cardiac Surgery Fellow  [phone]

## 2019-08-08 NOTE — ED TRIAGE NOTES
Patient presents with complaints of chest pain which started at 1700 tonight.Patient states it feel like heartburn. ABC intact without need for intervention at this time.

## 2019-08-08 NOTE — CONSULTS
NUTRITION CONSULT    Received standing order to educate patient on a Heart Healthy Diet post angiogram.    Will follow and complete assessment once patient appropriate and/or is transferred to medical unit.    Carolina Thompson RD, LD  Clinical Dietitian

## 2019-08-08 NOTE — ED PROVIDER NOTES
History     Chief Complaint:  Chest Pain    HPI  Gilbert Dawson is a 58 year old male who presents to the emergency department today for evaluation of chest pain. The patient reports he felt somewhat unwell last night, but did not think much of this as he frequently has aches and pains from his job. He states he felt a heartburn-like pain at that time. This afternoon around 1700, as the patient was outside working hard on a roof, he began to feel chest discomfort, jaw pain, and headache. The patient denies history of high blood pressure, diabetes, or high cholesterol.    Allergies:  Not allergic to any medications    Medications:    Medications reviewed. No pertinent medications.    Past Medical History:    Pulmonary valve stenosis  Pulmonary hypertension  VSD    Denies high blood pressure, diabetes, high cholesterol.    Past Surgical History:    Surgical history reviewed. No pertinent surgical history.    Family History:    Family history reviewed. No pertinent family history.    Social History:  The patient is accompanied by his wife. 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  Marital Status:      Review of Systems   HENT:        Positive for jaw pain.   Cardiovascular: Positive for chest pain (and discomfort).   Neurological: Positive for headaches.   All other systems reviewed and are negative.    Physical Exam     Patient Vitals for the past 24 hrs:   BP Temp Pulse Heart Rate Resp SpO2 Weight   08/07/19 2115 -- -- -- -- (!) 39 100 % --   08/07/19 2110 (!) 132/102 -- 82 92 10 100 % --   08/07/19 2105 (!) 131/100 -- 83 85 10 92 % --   08/07/19 2100 (!) 146/97 -- 85 80 10 100 % --   08/07/19 2056 (!) 130/97 97.8  F (36.6  C) 79 79 18 100 % --   08/07/19 2055 -- -- -- -- -- -- 95.3 kg (210 lb)     Physical Exam  General: Patient is alert and cooperative.  HENT:  Normal nose, oropharynx. Moist oral mucosa.  Eyes: EOMI. Normal conjunctiva.  Neck:  Normal range of motion and appearance.    Cardiovascular:  Normal rate, regular rhythm.   Pulmonary/Chest:  Effort normal. No wheezing or crackles. Speaking complete sentences.  Abdominal: Soft. No distension or tenderness.     Musculoskeletal: Normal range of motion. No edema or tenderness.   Neurological: oriented, normal strength, sensation, and coordination.   Skin: Warm and dry. No rash or bruising.   Psychiatric: Normal mood and affect. Normal behavior and judgement.      Emergency Department Course     ECG:  ECG taken at 2052, ECG read at 2105  Sinus rhythm with premature atrial complexes  Incomplete right bundle branch annamarie  ST elevation, consider inferior injury or acute infarct  ** ** ACUTE MI / STEMI ** **  Consider right ventricular involvement in acute inferior infarct  Rate 83 bpm. NV interval 174 ms. QRS duration 94 ms. QT/QTc 344/404 ms. P-R-T axes 51 80 100.    Imaging:  Radiology findings were communicated with the patient who voiced understanding of the findings.    XR Chest Port 1 View  Negative chest.    Reading per radiology     Laboratory:  Laboratory findings were communicated with the patient who voiced understanding of the findings.    INR: 1.08  Partial thromboplastin time: 25  CBC: WBC 12.7 (H), HGB 16.0,   BMP: Glucose 106 (H) o/w WNL (Creatinine 0.97)  Troponin I (Collected 2100): 1.475 ()  Troponin POCT (Collected 2059): 0.64 ()    Interventions:  2056 Aspirin 324 mg Oral  2104 Brilinta 180 mg Oral  2104 Heparin loading dose 6500 Units IV    Emergency Department Course:  2052 EKG obtained as noted above.  2054 Nursing notes and vitals reviewed.  2057 The patient was sent for a portable chest x-ray while in the emergency department, results above.   2059 I performed a physical examination of the patient as documented above.  2100 IV was inserted and blood was drawn for laboratory testing, results above.  2103 I spoke with Dr. Leong of the cardiology service from [facility] regarding patient's  presentation, findings, and plan of care.  2135 I again spoke with Dr. Leong.  2120 I personally reviewed the laboratory and imaging results with the patient and answered all related questions prior to transfer.    Impression & Plan      Critical Care time was  minutes for this patient excluding procedures.     Medical Decision Making:  Gilbert Dawson is a 58 year old male who presents to the emergency department today for evaluation of acute chest discomfort.  Triage EKG demonstrates sinus rhythm with ST segment elevations in the inferior leads with reciprocal ST depressions in leads I, aVL, and V1 through V4.  He is slightly hypertensive with a normal heart rate and oxygen saturations.  STEMI protocol was initiated.  He was medicated with aspirin, Brilinta, and a heparin bolus.  The interventional cardiologist was contacted and arrangements made for transfer to the coronary catheterization laboratory at [facility].    Diagnosis:    ICD-10-CM    1. ST elevation myocardial infarction (STEMI), unspecified artery (H) I21.3 CBC with platelets differential     INR     Partial thromboplastin time     Basic metabolic panel     Troponin I     Troponin POCT     Troponin POCT     Disposition:   The patient is transferred to the catheterization laboratory at [facility] for further evaluation and treatment under the care of Dr. Leong.    Scribe Disclosure:  I, Esteban Kimball, am serving as a scribe at 8:54 PM on 8/7/2019 to document services personally performed by Slava Cruz MD based on my observations and the provider's statements to me.    [facility] EMERGENCY DEPARTMENT       Slava Cruz MD  08/07/19 0515

## 2019-08-09 LAB
ANION GAP SERPL CALCULATED.3IONS-SCNC: 3 MMOL/L (ref 3–14)
BUN SERPL-MCNC: 17 MG/DL (ref 7–30)
CALCIUM SERPL-MCNC: 7.7 MG/DL (ref 8.5–10.1)
CHLORIDE SERPL-SCNC: 110 MMOL/L (ref 94–109)
CO2 SERPL-SCNC: 26 MMOL/L (ref 20–32)
CREAT SERPL-MCNC: 0.86 MG/DL (ref 0.66–1.25)
ERYTHROCYTE [DISTWIDTH] IN BLOOD BY AUTOMATED COUNT: 13.3 % (ref 10–15)
GFR SERPL CREATININE-BSD FRML MDRD: >90 ML/MIN/{1.73_M2}
GLUCOSE BLDC GLUCOMTR-MCNC: 103 MG/DL (ref 70–99)
GLUCOSE BLDC GLUCOMTR-MCNC: 113 MG/DL (ref 70–99)
GLUCOSE BLDC GLUCOMTR-MCNC: 88 MG/DL (ref 70–99)
GLUCOSE SERPL-MCNC: 106 MG/DL (ref 70–99)
HCT VFR BLD AUTO: 36.4 % (ref 40–53)
HGB BLD-MCNC: 12.4 G/DL (ref 13.3–17.7)
INTERPRETATION ECG - MUSE: NORMAL
KCT BLD-ACNC: 90 SEC (ref 75–150)
LMWH PPP CHRO-ACNC: 0.2 IU/ML
MAGNESIUM SERPL-MCNC: 1.7 MG/DL (ref 1.6–2.3)
MCH RBC QN AUTO: 33.3 PG (ref 26.5–33)
MCHC RBC AUTO-ENTMCNC: 34.1 G/DL (ref 31.5–36.5)
MCV RBC AUTO: 98 FL (ref 78–100)
PLATELET # BLD AUTO: 139 10E9/L (ref 150–450)
POTASSIUM SERPL-SCNC: 4.1 MMOL/L (ref 3.4–5.3)
RBC # BLD AUTO: 3.72 10E12/L (ref 4.4–5.9)
SODIUM SERPL-SCNC: 139 MMOL/L (ref 133–144)
TROPONIN I SERPL-MCNC: 39.96 UG/L (ref 0–0.04)
WBC # BLD AUTO: 11.3 10E9/L (ref 4–11)

## 2019-08-09 PROCEDURE — 25000132 ZZH RX MED GY IP 250 OP 250 PS 637: Performed by: NURSE PRACTITIONER

## 2019-08-09 PROCEDURE — 25800030 ZZH RX IP 258 OP 636: Performed by: NURSE PRACTITIONER

## 2019-08-09 PROCEDURE — 85520 HEPARIN ASSAY: CPT | Performed by: INTERNAL MEDICINE

## 2019-08-09 PROCEDURE — 85347 COAGULATION TIME ACTIVATED: CPT

## 2019-08-09 PROCEDURE — 93005 ELECTROCARDIOGRAM TRACING: CPT

## 2019-08-09 PROCEDURE — 00000146 ZZHCL STATISTIC GLUCOSE BY METER IP

## 2019-08-09 PROCEDURE — 85027 COMPLETE CBC AUTOMATED: CPT | Performed by: INTERNAL MEDICINE

## 2019-08-09 PROCEDURE — 20000003 ZZH R&B ICU

## 2019-08-09 PROCEDURE — 80048 BASIC METABOLIC PNL TOTAL CA: CPT | Performed by: INTERNAL MEDICINE

## 2019-08-09 PROCEDURE — 99233 SBSQ HOSP IP/OBS HIGH 50: CPT | Performed by: NURSE PRACTITIONER

## 2019-08-09 PROCEDURE — 25000128 H RX IP 250 OP 636: Performed by: INTERNAL MEDICINE

## 2019-08-09 PROCEDURE — 25000132 ZZH RX MED GY IP 250 OP 250 PS 637: Performed by: INTERNAL MEDICINE

## 2019-08-09 PROCEDURE — 83735 ASSAY OF MAGNESIUM: CPT | Performed by: INTERNAL MEDICINE

## 2019-08-09 PROCEDURE — 99233 SBSQ HOSP IP/OBS HIGH 50: CPT | Performed by: INTERNAL MEDICINE

## 2019-08-09 PROCEDURE — 25000128 H RX IP 250 OP 636: Performed by: NURSE PRACTITIONER

## 2019-08-09 PROCEDURE — 93010 ELECTROCARDIOGRAM REPORT: CPT | Performed by: INTERNAL MEDICINE

## 2019-08-09 PROCEDURE — 84484 ASSAY OF TROPONIN QUANT: CPT | Performed by: INTERNAL MEDICINE

## 2019-08-09 PROCEDURE — 25800030 ZZH RX IP 258 OP 636: Performed by: INTERNAL MEDICINE

## 2019-08-09 RX ORDER — CYCLOBENZAPRINE HCL 10 MG
10 TABLET ORAL 3 TIMES DAILY
Status: DISCONTINUED | OUTPATIENT
Start: 2019-08-09 | End: 2019-08-10

## 2019-08-09 RX ORDER — DIAZEPAM 10 MG/2ML
1 INJECTION, SOLUTION INTRAMUSCULAR; INTRAVENOUS ONCE
Status: COMPLETED | OUTPATIENT
Start: 2019-08-09 | End: 2019-08-09

## 2019-08-09 RX ORDER — POTASSIUM CHLORIDE 1500 MG/1
20-40 TABLET, EXTENDED RELEASE ORAL
Status: DISCONTINUED | OUTPATIENT
Start: 2019-08-09 | End: 2019-08-11 | Stop reason: HOSPADM

## 2019-08-09 RX ORDER — DIAZEPAM 2 MG
2 TABLET ORAL EVERY 8 HOURS PRN
Status: DISCONTINUED | OUTPATIENT
Start: 2019-08-09 | End: 2019-08-11

## 2019-08-09 RX ORDER — ACETAMINOPHEN 325 MG/1
975 TABLET ORAL EVERY 6 HOURS
Status: DISCONTINUED | OUTPATIENT
Start: 2019-08-09 | End: 2019-08-10

## 2019-08-09 RX ORDER — POTASSIUM CHLORIDE 1.5 G/1.58G
20-40 POWDER, FOR SOLUTION ORAL
Status: DISCONTINUED | OUTPATIENT
Start: 2019-08-09 | End: 2019-08-11 | Stop reason: HOSPADM

## 2019-08-09 RX ORDER — ATORVASTATIN CALCIUM 80 MG/1
80 TABLET, FILM COATED ORAL DAILY
Status: DISCONTINUED | OUTPATIENT
Start: 2019-08-10 | End: 2019-08-11 | Stop reason: HOSPADM

## 2019-08-09 RX ORDER — POTASSIUM CL/LIDO/0.9 % NACL 10MEQ/0.1L
10 INTRAVENOUS SOLUTION, PIGGYBACK (ML) INTRAVENOUS
Status: DISCONTINUED | OUTPATIENT
Start: 2019-08-09 | End: 2019-08-11 | Stop reason: HOSPADM

## 2019-08-09 RX ORDER — AMIODARONE HYDROCHLORIDE 200 MG/1
400 TABLET ORAL DAILY
Status: DISCONTINUED | OUTPATIENT
Start: 2019-08-09 | End: 2019-08-10

## 2019-08-09 RX ORDER — MAGNESIUM SULFATE HEPTAHYDRATE 40 MG/ML
4 INJECTION, SOLUTION INTRAVENOUS EVERY 4 HOURS PRN
Status: DISCONTINUED | OUTPATIENT
Start: 2019-08-09 | End: 2019-08-11 | Stop reason: HOSPADM

## 2019-08-09 RX ORDER — POTASSIUM CHLORIDE 29.8 MG/ML
20 INJECTION INTRAVENOUS
Status: DISCONTINUED | OUTPATIENT
Start: 2019-08-09 | End: 2019-08-11 | Stop reason: HOSPADM

## 2019-08-09 RX ORDER — POTASSIUM CHLORIDE 7.45 MG/ML
10 INJECTION INTRAVENOUS
Status: DISCONTINUED | OUTPATIENT
Start: 2019-08-09 | End: 2019-08-11 | Stop reason: HOSPADM

## 2019-08-09 RX ORDER — DIAZEPAM 10 MG/2ML
2.5 INJECTION, SOLUTION INTRAMUSCULAR; INTRAVENOUS ONCE
Status: COMPLETED | OUTPATIENT
Start: 2019-08-09 | End: 2019-08-09

## 2019-08-09 RX ORDER — MAGNESIUM SULFATE HEPTAHYDRATE 40 MG/ML
2 INJECTION, SOLUTION INTRAVENOUS DAILY PRN
Status: DISCONTINUED | OUTPATIENT
Start: 2019-08-09 | End: 2019-08-11 | Stop reason: HOSPADM

## 2019-08-09 RX ORDER — KETOROLAC TROMETHAMINE 15 MG/ML
15 INJECTION, SOLUTION INTRAMUSCULAR; INTRAVENOUS ONCE
Status: COMPLETED | OUTPATIENT
Start: 2019-08-09 | End: 2019-08-09

## 2019-08-09 RX ORDER — FENTANYL CITRATE 50 UG/ML
50 INJECTION, SOLUTION INTRAMUSCULAR; INTRAVENOUS ONCE
Status: COMPLETED | OUTPATIENT
Start: 2019-08-09 | End: 2019-08-09

## 2019-08-09 RX ORDER — LIDOCAINE 4 G/G
1-3 PATCH TOPICAL
Status: DISCONTINUED | OUTPATIENT
Start: 2019-08-09 | End: 2019-08-11

## 2019-08-09 RX ADMIN — DIAZEPAM 2 MG: 2 TABLET ORAL at 13:33

## 2019-08-09 RX ADMIN — CYCLOBENZAPRINE HYDROCHLORIDE 10 MG: 10 TABLET, FILM COATED ORAL at 08:27

## 2019-08-09 RX ADMIN — ATORVASTATIN CALCIUM 40 MG: 40 TABLET, FILM COATED ORAL at 08:27

## 2019-08-09 RX ADMIN — DIAZEPAM 1 MG: 5 INJECTION, SOLUTION INTRAMUSCULAR; INTRAVENOUS at 02:28

## 2019-08-09 RX ADMIN — KETOROLAC TROMETHAMINE 15 MG: 15 INJECTION, SOLUTION INTRAMUSCULAR; INTRAVENOUS at 14:30

## 2019-08-09 RX ADMIN — HEPARIN SODIUM 950 UNITS/HR: 10000 INJECTION, SOLUTION INTRAVENOUS at 00:32

## 2019-08-09 RX ADMIN — ACETAMINOPHEN 975 MG: 325 TABLET, FILM COATED ORAL at 14:30

## 2019-08-09 RX ADMIN — TICAGRELOR 90 MG: 90 TABLET ORAL at 20:16

## 2019-08-09 RX ADMIN — HYDROCODONE BITARTRATE AND ACETAMINOPHEN 1 TABLET: 5; 325 TABLET ORAL at 02:25

## 2019-08-09 RX ADMIN — LIDOCAINE 2 PATCH: 560 PATCH PERCUTANEOUS; TOPICAL; TRANSDERMAL at 08:38

## 2019-08-09 RX ADMIN — SODIUM CHLORIDE: 9 INJECTION, SOLUTION INTRAVENOUS at 15:23

## 2019-08-09 RX ADMIN — SODIUM CHLORIDE: 9 INJECTION, SOLUTION INTRAVENOUS at 00:34

## 2019-08-09 RX ADMIN — ASPIRIN 81 MG: 81 TABLET, COATED ORAL at 08:31

## 2019-08-09 RX ADMIN — SODIUM CHLORIDE: 9 INJECTION, SOLUTION INTRAVENOUS at 07:54

## 2019-08-09 RX ADMIN — MAGNESIUM SULFATE HEPTAHYDRATE 2 G: 40 INJECTION, SOLUTION INTRAVENOUS at 10:03

## 2019-08-09 RX ADMIN — AMIODARONE HYDROCHLORIDE 0.5 MG/MIN: 50 INJECTION, SOLUTION INTRAVENOUS at 02:20

## 2019-08-09 RX ADMIN — FENTANYL CITRATE 25 MCG: 50 INJECTION, SOLUTION INTRAMUSCULAR; INTRAVENOUS at 12:52

## 2019-08-09 RX ADMIN — TICAGRELOR 90 MG: 90 TABLET ORAL at 08:27

## 2019-08-09 RX ADMIN — METOPROLOL TARTRATE 12.5 MG: 25 TABLET, FILM COATED ORAL at 20:16

## 2019-08-09 RX ADMIN — FENTANYL CITRATE 25 MCG: 50 INJECTION, SOLUTION INTRAMUSCULAR; INTRAVENOUS at 12:32

## 2019-08-09 RX ADMIN — CYCLOBENZAPRINE HYDROCHLORIDE 10 MG: 10 TABLET, FILM COATED ORAL at 15:57

## 2019-08-09 RX ADMIN — DIAZEPAM 2.5 MG: 5 INJECTION, SOLUTION INTRAMUSCULAR; INTRAVENOUS at 12:38

## 2019-08-09 RX ADMIN — METOPROLOL TARTRATE 12.5 MG: 25 TABLET, FILM COATED ORAL at 14:31

## 2019-08-09 RX ADMIN — AMIODARONE HYDROCHLORIDE 400 MG: 200 TABLET ORAL at 09:14

## 2019-08-09 RX ADMIN — ACETAMINOPHEN 975 MG: 325 TABLET, FILM COATED ORAL at 08:28

## 2019-08-09 ASSESSMENT — ACTIVITIES OF DAILY LIVING (ADL)
ADLS_ACUITY_SCORE: 11

## 2019-08-09 ASSESSMENT — MIFFLIN-ST. JEOR: SCORE: 1821.88

## 2019-08-09 NOTE — PLAN OF CARE
[id number]  Pt complained mostly of lower back pain and L shoulder pain.  I: Tylenol, Flexoril, and Lidocaine patches and repositioned, backrubs  E: Slept soundly  CV: Cont with SR with frequent PAC's.  I: Oral Amiodarone given, IV Amiodarone discontinued at   Dr Spivey here.IABP can be removed.  IABP 1:2. Cath lab called back and Heparin disc.  ACT 1130.  When ACT < 180, will call to remove.    When IABP on standby, B/P /69-70's  1135 ACT 90.  Cath lab notified.  1230 Dr Tubbs here and disc venous sheath and IABP.  Manual pressure X 10 min followed by Femostop.  Pressure released gradually and off by 1340.  No hematoma, Remains tender to touch as prior to discontinuation.  See graphic for details.  Back pain remains 9 with almost constant spasms despite Fentanyl and Valium IV with discontinuing IABP,oral Valium, slight repositioning.  I: S Pals NP notified and Toradol given with Tylenol.  I: Pain decreased to a  7 by 1500.    Metoprolol held but B/P increased by 1430 so given.  PAC's decreased.  Wife updated by nurse.

## 2019-08-09 NOTE — PROGRESS NOTES
SURGICAL ICU PROGRESS NOTE  August 8, 2019      ASSESSMENT: Gilbert Dawson is a 58 year old male with history congenital heart disease, pulmonary stenosis and VSD that presented with angina and was found to have an acute STEMI.  He was emergently take to angio for thrombectomy and stenting in two locations.  He was referred to ICU for cardiogenic shock.     Plans for today:   -Continue supportive cares   -Add scheduled flexeril, PRN valium, and lidocaine patches   -Add electrolytes replacement protocol   -Defer to CV regarding IABP    PLAN:  Neuro/ pain/ sedation:  Acute low back pain   - Pain well-controlled and minimal.  Anginal pain is resolved.    - dilaudid, Valium, Norco  Prn.  - Scheduled flexeril   - Lidoderm patch    Pulmonary care:   - Supplemental oxygen to keep saturation above 92 %.  - Incentive spirometer every 15- 30 minutes when awake.    Cardiovascular:    - STEMI s/p stent X 2 currently on IABP.    - Cardiogenic shock-  - Atrial fibrillation in the setting STEMI  - Ischemic cardiomyopathy with EF of 30%.    - Vtach in the setting STEMI, intermittent/asymptomatic.  - Cardiac meds per Cardiology (statin, aspirin, ticagrelor, heparin)  - Currently off pressors   - Amiodarone drip.    - Serial EKGs per Cardiology.  - Repeat echos per Cardiology.    - Monitor hemodynamic status.   - optimize electrolytes as needed.      GI care/Nutrition:  - Clear liquids.        Renal/ Fluid Balance/Electrolytes:    - Urine output is adequate so far.  - Will continue to monitor intake and output.    Endocrine:    - No management indication.   - Sliding scale for diabetes management.     ID/ Antibiotics:  - No indication for antibiotics.     Heme:     - Hemoglobin stable. Minimal intraoperative blood loss.    - Monitor H/H.      Prophylaxis:    - On heparin drip.    - No GI prophylaxis needed.      Lines/ tubes/ drains:  - PIV  - Right groin procedural access    Disposition:  - [facility] ICU    Patient seen,  findings and plan discussed with surgical ICU staff, Dr. Kelsey.  Critical care time: 35 minutes    Enoch Kidd, NAVARRO     ====================================    SUBJECTIVE:   Worsening chronic right shoulder pain and low back pain. Otherwise ROS negative. No reports of V-tach overnight.       OBJECTIVE:   1. VITAL SIGNS:   Temp:  [98.3  F (36.8  C)-99  F (37.2  C)] 98.6  F (37  C)  Pulse:  [63-92] 77  Heart Rate:  [] 73  Resp:  [7-26] 11  BP: ()/() 128/80  SpO2:  [92 %-100 %] 100 %  Resp: 11      2. INTAKE/ OUTPUT:   I/O last 3 completed shifts:  In: 3778.28 [P.O.:1160; I.V.:2618.28]  Out: 925 [Urine:925]    3. PHYSICAL EXAMINATION:     GEN: NAD, lying comfortably in bed.    EYES: PERRL, Anicteric sclera.   HEENT:  Normocephalic, atraumatic, trachea midline  CV: IRIR, no gallops, rubs, or murmurs  PULM/CHEST: Clear breath sounds bilaterally without rhonchi, crackles or wheeze, symmetric chest rise  GI: normal bowel sounds, soft, non-tender, no rebound tenderness or guarding, no masses  : No hancock.  EXTREMITIES: No peripheral edema, moving all extremities, peripheral pulses intact  NEURO: Cranial nerves II-XII grossly intact, no motor-sensory deficits noted  SKIN: No rashes, sores or ulcerations  PSYCH:  Affect: appropriate    Imaging personally reviewed:  ECG  4. INVESTIGATIONS:   Arterial Blood Gases   No lab results found in last 7 days.  Complete Blood Count   Recent Labs   Lab 08/09/19  0400 08/08/19  0600 08/07/19  2100   WBC 11.3* 12.8* 12.7*   HGB 12.4* 13.7 16.0   * 218 270     Basic Metabolic Panel  Recent Labs   Lab 08/09/19  0400 08/08/19  0600 08/07/19  2100    141 141   POTASSIUM 4.1 4.6 4.7   CHLORIDE 110* 111* 108   CO2 26 24 28   BUN 17 21 26   CR 0.86 0.89 0.97   * 115* 106*     Liver Function Tests  Recent Labs   Lab 08/07/19  2100   INR 1.08     Pancreatic Enzymes  No lab results found in last 7 days.  Coagulation Profile  Recent Labs   Lab  08/07/19  2100   INR 1.08   PTT 25         5. RADIOLOGY:   No results found for this or any previous visit (from the past 24 hour(s)).    =========================================

## 2019-08-09 NOTE — CONSULTS
Medication coverage check for Brilinta. $162 monthly with coupon card.    Lennie Simons CphT  [facility] Discharge Pharmacy Liaison  Liaison Cell: [phone]

## 2019-08-09 NOTE — PROGRESS NOTES
Patient on bedrest with IABP in place. Some discomfort at R hip where IABP insertion is, otherwise patient denied pain. Several runs of self limiting vtach today (approx 15-20 beats at most), on amio gtt. Tolerating diet, poor appetite. Patient and wife updated by cardiologist, CV surgeons, and ICU team today.

## 2019-08-09 NOTE — PLAN OF CARE
Pt alert and oriented x4.  Pt continues with balloon pump in place to R groin.  Pt MAPS  70s-90s this shift with assisted BPs 80s-90s/60s-70s.  Pt denies dizziness,denies nausea, denies chest pain.  Pt in sinus rhythm with frequent PJCs and occas PVCs.  No runs of vtach noted this shift.  Pt co L shoulder pain x1 resolved with positioning.  Pt uncomfortable during the night requiring frequent position changes and lower back massage which provided some relief.  Pt c/o back spasms to bilat lower back, partially relieved with positioning.  Pt slept poor during the night.  Pt trop trending down, labs stable.  Pt drinking water.  Resting as able.

## 2019-08-09 NOTE — PROGRESS NOTES
[facility]    Cardiology Progress Note     Assessment & Plan   Gilbert Dawson is a 58 year old male who was admitted on 8/7/2019.     1.  Inferior ST segment elevation myocardial infarction with involvement of the anterior segments due to right-to-left collateral involvement   2.   of proximal LAD and large diagonal, 60% proximal circumflex disease  3.  Cardiogenic shock- resolving  4.  Small muscular VSD with a Qp/Qs of 1.3 on echocardiogram in 2016  5.  Mild pulmonary stenosis mean gradient of 24 mmHg in 2016  6.  Ischemic cardiomyopathy with ejection fraction of less than 30%  7.  Paroxysmal atrial fibrillation  8.  Right bundle branch block  9.  Tobacco abuse    The patient had successful intervention on the right CVA with good results.  I will continue him on aspirin and Brilinta uninterrupted for next 6-12 months.  However he received a newer generation Medtronic stent and there is some data to to advocate shorter duration of DAPT therapy which would be desirable if he is a candidate for CABG.  I will decide candidacy for bypass surgery based on the results of the viability study/cardiac MRI.  Because we have committed ourselves to dual antiplatelet therapy for at least the next several months I do not think there is no urgency to performing cardiac MRI. I will do it as out pt and will include flow to assess the VSD shunt and PS.     Cardiogenic shock is resolving.  Given that his blood pressure is significantly improved and he is chest pain-free and off pressors I would discontinue balloon pump.  He is on heparin drip which will be held prior to removal of the pump.  He still has a lot of PVCs on the on the monitor and I think I will continue him on 400 mg of amiodarone p.o. for now.  If he does not have any more PVCs or his nonsustained VT over the next day or so I think it will be safe to discontinue amiodarone completely and keep him on a beta-blocker. Will stat metoprolol 12.5 mg po  bid. If he tolerates it will start Ace, low dose. Will also uptitrate Lipitor to 80 mg daily.     He had runs of paroxsymal afib and he has HUE VASC score of 1 for CHF. More over I think the episode of Afb was 2/2 to the acute issues and hence I don't think he needs to be started on anticoagulation urgently. However he does have apical hypokinesis which puts him at at risk of apical thrombus formation.  I think he should be on anticoagulation. However, this would mean triple therapy. I would continue him on DAPT for a month and will perform 14 day Zio at discharge. If it shows recurrence of Afib I would strongly consider starting AC even with low HUE VASC during clinic visit.      Willam Spivey MD  Text Page (7am - 5pm, M-F)    Interval History   Briefly this is a 58-year-old male with past medical history of congenital heart disease with small muscular VSD with Qp/Qs of 1.3 and mild pulmonary stressors mean 24 mmHg based on echocardiogram in 2016, type 2 diabetes, hypertension who presented initially with an inferior STEMI and cardiogenic shock.  On cardiac catheterization he was found to have a chronically occluded proximal LAD, diagonal in addition to thrombotic occlusion of the culprit RCA.  There were extensive right-to-left collaterals and occlusion of the RCA probably led to ischemia of the anterior territory in addition to the inferior walls causing involvement of the large segment of LV.  Initially the heart was to perform angioplasty balloon angioplasty of the RCA and have CT surgery evaluate the patient.  However, because the balloon angioplasty showed poor results and because the patient had received Brilinta load in the emergency room it was decided to proceed with PCI of the RCA which was performed with 2 newer generation Medtronic stents. Fortunately, excellent flow was established after PCI which led to resolution of patient's symptoms and near normalization of the EKG. due to cardiogenic shock it  was decided to put in IABP.  Impella was not performed due to history of VSD and the thought that it would cause right to left shunting.  The patient will also started on dopamine drip which led to runs of NSVT.  He was then switched to levophed with improvement in blood pressure and decrease in occurrence of NSVT.  He was also started on amiodarone for NSVT.    Today, the patient is feeling extremely well.  He reports resolution of the left shoulder pain.  His blood pressure is significantly improved and he is off Levophed drip since 2 AM last night.  He is on amnio drip which is supposed to run out this morning.    Physical Exam   Temp: 98.1  F (36.7  C) Temp src: Oral BP: 128/80 Pulse: 73 Heart Rate: 68 Resp: 13 SpO2: 100 % O2 Device: Nasal cannula Oxygen Delivery: 2 LPM  Vitals:    08/08/19 0215 08/09/19 0100   Weight: 96.3 kg (212 lb 4.9 oz) 95.8 kg (211 lb 3.2 oz)     Vital Signs with Ranges  Temp:  [98.1  F (36.7  C)-99  F (37.2  C)] 98.1  F (36.7  C)  Pulse:  [63-92] 73  Heart Rate:  [] 68  Resp:  [7-25] 13  BP: ()/() 128/80  SpO2:  [92 %-100 %] 100 %  I/O last 3 completed shifts:  In: 3778.28 [P.O.:1160; I.V.:2618.28]  Out: 925 [Urine:925]  Patient Active Problem List   Diagnosis     Cardiogenic shock (H)       Constitutional: awake, alert, no distress  Skin: Warm and dry to touch  Head: Normocephalic, atraumatic  Eyes: Conjunctivae and lids unremarkable, sclera white  ENT: No pallor or cyanosis  Respiratory: Normal breath sounds, clear to auscultation  Cardiac: Regular rate and rhythm, S1-S2 normal.  No murmurs gallops or rubs.   No pedal edema.   Extremities and musculoskeletal: No gross motor deficit, Balloon pump site without e/o bleeding or hematoma. 2+ distal pulse.   Neurological.  Affect normal  Psych: Alert and oriented x3      Medications     DOPamine Stopped (08/07/19 2246)     norepinephrine Stopped (08/08/19 1455)     Percutaneous Coronary Intervention orders placed (this is  information for BPA alerting)       ACE/ARB/ARNI NOT PRESCRIBED       BETA BLOCKER NOT PRESCRIBED       sodium chloride Stopped (08/08/19 1153)     sodium chloride 75 mL/hr at 08/09/19 0754       acetaminophen  975 mg Oral Q6H     amiodarone  400 mg Oral Daily     aspirin  81 mg Oral Daily     atorvastatin  40 mg Oral Daily     cyclobenzaprine  10 mg Oral TID     lidocaine  1-3 patch Transdermal Q24H     lidocaine   Transdermal Q8H     lidocaine   Transdermal Q24h     ticagrelor  90 mg Oral Q12H       Data   Results for orders placed or performed during the hospital encounter of 08/07/19 (from the past 24 hour(s))   Glucose by meter   Result Value Ref Range    Glucose 100 (H) 70 - 99 mg/dL   Troponin I   Result Value Ref Range    Troponin I ES 85.977 (HH) 0.000 - 0.045 ug/L   Heparin 10a Level   Result Value Ref Range    Heparin 10A Level 0.28 IU/mL   Glucose by meter   Result Value Ref Range    Glucose 102 (H) 70 - 99 mg/dL   Troponin I   Result Value Ref Range    Troponin I ES 71.030 (HH) 0.000 - 0.045 ug/L   Glucose by meter   Result Value Ref Range    Glucose 113 (H) 70 - 99 mg/dL   Heparin Xa level (AM Draw)   Result Value Ref Range    Heparin 10A Level 0.20 IU/mL   Basic metabolic panel (AM Draw)   Result Value Ref Range    Sodium 139 133 - 144 mmol/L    Potassium 4.1 3.4 - 5.3 mmol/L    Chloride 110 (H) 94 - 109 mmol/L    Carbon Dioxide 26 20 - 32 mmol/L    Anion Gap 3 3 - 14 mmol/L    Glucose 106 (H) 70 - 99 mg/dL    Urea Nitrogen 17 7 - 30 mg/dL    Creatinine 0.86 0.66 - 1.25 mg/dL    GFR Estimate >90 >60 mL/min/[1.73_m2]    GFR Estimate If Black >90 >60 mL/min/[1.73_m2]    Calcium 7.7 (L) 8.5 - 10.1 mg/dL   CBC (AM Draw)   Result Value Ref Range    WBC 11.3 (H) 4.0 - 11.0 10e9/L    RBC Count 3.72 (L) 4.4 - 5.9 10e12/L    Hemoglobin 12.4 (L) 13.3 - 17.7 g/dL    Hematocrit 36.4 (L) 40.0 - 53.0 %    MCV 98 78 - 100 fl    MCH 33.3 (H) 26.5 - 33.0 pg    MCHC 34.1 31.5 - 36.5 g/dL    RDW 13.3 10.0 - 15.0 %     Platelet Count 139 (L) 150 - 450 10e9/L   Troponin I   Result Value Ref Range    Troponin I ES 39.959 (HH) 0.000 - 0.045 ug/L   Magnesium (AM Draw)   Result Value Ref Range    Magnesium 1.7 1.6 - 2.3 mg/dL   EKG 12-lead, tracing only    Result Value Ref Range    Interpretation ECG Click View Image link to view waveform and result

## 2019-08-09 NOTE — PLAN OF CARE
OT/CR: Pt admitted due to STEMI, spoke with nursing, pt currently has IABP and not appropriate for therapy today. CV surgery consult possible surgery.

## 2019-08-10 ENCOUNTER — APPOINTMENT (OUTPATIENT)
Dept: OCCUPATIONAL THERAPY | Facility: CLINIC | Age: 59
DRG: 270 | End: 2019-08-10
Payer: COMMERCIAL

## 2019-08-10 ENCOUNTER — APPOINTMENT (OUTPATIENT)
Dept: OCCUPATIONAL THERAPY | Facility: CLINIC | Age: 59
DRG: 270 | End: 2019-08-10
Attending: INTERNAL MEDICINE
Payer: COMMERCIAL

## 2019-08-10 PROCEDURE — 97535 SELF CARE MNGMENT TRAINING: CPT | Mod: GO

## 2019-08-10 PROCEDURE — 97165 OT EVAL LOW COMPLEX 30 MIN: CPT | Mod: GO

## 2019-08-10 PROCEDURE — 25000132 ZZH RX MED GY IP 250 OP 250 PS 637: Performed by: HOSPITALIST

## 2019-08-10 PROCEDURE — 99207 ZZC CONSULT E&M CHANGED TO SUBSEQUENT LEVEL: CPT | Performed by: HOSPITALIST

## 2019-08-10 PROCEDURE — 25000132 ZZH RX MED GY IP 250 OP 250 PS 637: Performed by: NURSE PRACTITIONER

## 2019-08-10 PROCEDURE — 99232 SBSQ HOSP IP/OBS MODERATE 35: CPT | Performed by: INTERNAL MEDICINE

## 2019-08-10 PROCEDURE — 25000132 ZZH RX MED GY IP 250 OP 250 PS 637: Performed by: INTERNAL MEDICINE

## 2019-08-10 PROCEDURE — 99232 SBSQ HOSP IP/OBS MODERATE 35: CPT | Performed by: HOSPITALIST

## 2019-08-10 PROCEDURE — 97110 THERAPEUTIC EXERCISES: CPT | Mod: GO

## 2019-08-10 PROCEDURE — 21000001 ZZH R&B HEART CARE

## 2019-08-10 RX ORDER — LISINOPRIL 2.5 MG/1
2.5 TABLET ORAL ONCE
Status: COMPLETED | OUTPATIENT
Start: 2019-08-10 | End: 2019-08-10

## 2019-08-10 RX ORDER — METOPROLOL TARTRATE 25 MG/1
25 TABLET, FILM COATED ORAL 2 TIMES DAILY
Status: DISCONTINUED | OUTPATIENT
Start: 2019-08-10 | End: 2019-08-11 | Stop reason: HOSPADM

## 2019-08-10 RX ORDER — LISINOPRIL 2.5 MG/1
2.5 TABLET ORAL DAILY
Status: DISCONTINUED | OUTPATIENT
Start: 2019-08-10 | End: 2019-08-10

## 2019-08-10 RX ORDER — NITROGLYCERIN 0.4 MG/1
TABLET SUBLINGUAL
Qty: 15 TABLET | Refills: 0 | Status: SHIPPED | OUTPATIENT
Start: 2019-08-10 | End: 2019-08-11

## 2019-08-10 RX ORDER — ACETAMINOPHEN 325 MG/1
975 TABLET ORAL EVERY 6 HOURS PRN
COMMUNITY
Start: 2019-08-10

## 2019-08-10 RX ORDER — ATORVASTATIN CALCIUM 80 MG/1
80 TABLET, FILM COATED ORAL DAILY
Qty: 30 TABLET | Refills: 0 | Status: SHIPPED | OUTPATIENT
Start: 2019-08-11 | End: 2019-08-11

## 2019-08-10 RX ORDER — CYCLOBENZAPRINE HCL 10 MG
10 TABLET ORAL 3 TIMES DAILY PRN
Status: DISCONTINUED | OUTPATIENT
Start: 2019-08-10 | End: 2019-08-11

## 2019-08-10 RX ORDER — LISINOPRIL 5 MG/1
5 TABLET ORAL DAILY
Status: DISCONTINUED | OUTPATIENT
Start: 2019-08-11 | End: 2019-08-11 | Stop reason: HOSPADM

## 2019-08-10 RX ORDER — ACETAMINOPHEN 325 MG/1
975 TABLET ORAL EVERY 6 HOURS PRN
Status: DISCONTINUED | OUTPATIENT
Start: 2019-08-10 | End: 2019-08-11 | Stop reason: HOSPADM

## 2019-08-10 RX ADMIN — METOPROLOL TARTRATE 25 MG: 25 TABLET ORAL at 20:11

## 2019-08-10 RX ADMIN — LISINOPRIL 2.5 MG: 2.5 TABLET ORAL at 14:55

## 2019-08-10 RX ADMIN — ASPIRIN 81 MG: 81 TABLET, COATED ORAL at 08:01

## 2019-08-10 RX ADMIN — ATORVASTATIN CALCIUM 80 MG: 40 TABLET, FILM COATED ORAL at 08:01

## 2019-08-10 RX ADMIN — AMIODARONE HYDROCHLORIDE 400 MG: 200 TABLET ORAL at 08:01

## 2019-08-10 RX ADMIN — LISINOPRIL 2.5 MG: 2.5 TABLET ORAL at 09:07

## 2019-08-10 RX ADMIN — TICAGRELOR 90 MG: 90 TABLET ORAL at 08:01

## 2019-08-10 RX ADMIN — METOPROLOL TARTRATE 12.5 MG: 25 TABLET, FILM COATED ORAL at 08:01

## 2019-08-10 RX ADMIN — TICAGRELOR 90 MG: 90 TABLET ORAL at 20:11

## 2019-08-10 SDOH — HEALTH STABILITY: MENTAL HEALTH: HOW MANY DRINKS CONTAINING ALCOHOL DO YOU HAVE ON A TYPICAL DAY WHEN YOU ARE DRINKING?: 3 OR 4

## 2019-08-10 SDOH — HEALTH STABILITY: MENTAL HEALTH: HOW OFTEN DO YOU HAVE A DRINK CONTAINING ALCOHOL?: 2-4 TIMES A MONTH

## 2019-08-10 ASSESSMENT — MIFFLIN-ST. JEOR: SCORE: 1826.88

## 2019-08-10 ASSESSMENT — ACTIVITIES OF DAILY LIVING (ADL)
ADLS_ACUITY_SCORE: 11
ADLS_ACUITY_SCORE: 11
PREVIOUS_RESPONSIBILITIES: MEAL PREP;HOUSEKEEPING;LAUNDRY;SHOPPING;YARDWORK;MEDICATION MANAGEMENT;FINANCES;DRIVING;WORK
ADLS_ACUITY_SCORE: 11

## 2019-08-10 NOTE — CONSULTS
[facility]    Hospitalist Consultation    Date of Admission:  8/7/2019    Assessment & Plan   Gilbert Dawson is a 58 year old male who was admitted on 8/7/2019 with STEMI, underwent emergent thrombectomy and stenting x2 with cardiology, required IABP for cardiogenic shock. I was asked to see the patient for transfer of care to hospitalist service.    Inferior STEMI s/p stent x2   of proximal LAD and large diagonal, 60% proximal circumflex disease  Cardiogenic shock- resolving  Small muscular VSD  Mild pulmonary stenosis  Ischemic cardiomyopathy with EF <30%  Required IABP for cardiogenic shock. Had stent x2 and thrombectomy emergently for his STEMI. Was monitored in the ICU since admit until 8/10. Weaned off pressors 8/9. Off amiodarone gtt. Now just on typical post STEMI medications  - Atorvastatin 80mg  - DAPT with brilinta and ASA  - Metoprolol tartrate 25mg BID and lisinopril 2.5mg, elevated BP this afternoon after initiation of medications, give additional 2.5 mg lisinopril now and switch to 5mg daily in AM, keep metoprolol at current dose  - Further follow up with cardiothoracic surgery in the future, undergo some viability testing with repeat Echo after discharge.  -Cardiac rehab after discharge    Paroxysmal atrial fibrillation  CHADSVASC=1. Had been having runs of atrial fibrillation in the setting of cardiogenic shock. Per cardiology, no plans for anticoagulation at this time.  - Continue dual antiplatelet for above, then do 14 day ZIO patch on discharge (if recurrence of atrial fib there, then would add anticoagulation)    Tobacco abuse  Half pack per day. Encourage smoking cessation  - If he feels the urge, would offer nicotine patch    Chronic left shoulder pain  - Continue to follow with Dr Aquino, tylenol PRN pain    DVT Prophylaxis: PCDs  Code Status: Full Code  Disposition: Expected discharge in 24-48 hours pending stability on blood pressure medications  Transition to cardiac  telemetry    Shilpi Padilla DO    Reason for Consult   Transfer of care    Primary Care Physician   [facility]    Chief Complaint   Chest pain    History is obtained from the patient    History of Present Illness   Gilbert Dawson is a 58 year old male who presented to the ED on 8/7 with chest pain that was radiating into his jaw and was associated with diaphoresis. He was found to have marked EKG changes at [facility], was transferred to [facility]. When he arrived he had frequent PACs, went into atrial fibrillation and then episodes of non sustained ventricular tachycardia. He was taken emergently to cath lab, had 2 stents placed in the RCA and then required IABP for cardiogenic shock. Recovered over the next few days, off all pressor support and gtt medications for heart rate control on 8/9. Transferred out of the ICU on 8/10--feels great. He has no complaints. Walking around the ICU, tolerating diet. No lightheadedness, dizzinesss, chest pain, shortness of breath, nausea, vomiting, fevers, chills.    Past Medical History    I have reviewed this patient's medical history and updated it with pertinent information if needed.   Past Medical History:   Diagnosis Date     Chronic left shoulder pain      Pulmonary stenosis      Right bundle branch block      Tobacco abuse      VSD (ventricular septal defect), muscular        Past Surgical History   I have reviewed this patient's surgical history and updated it with pertinent information if needed.  Past Surgical History:   Procedure Laterality Date     CV CENTRAL VENOUS CATHETER PLACEMENT N/A 8/7/2019    Procedure: Central Venous Catheter Placement;  Surgeon: Enoch Leong MD;  Location: [facility] CARDIAC CATH LAB     CV CORONARY ANGIOGRAM N/A 8/7/2019    Procedure: Coronary Angiogram;  Surgeon: Enoch Leong MD;  Location: [facility] CARDIAC CATH LAB     CV INTRA-AORTIC BALLOON PUMP INSERTION N/A 8/7/2019    Procedure: Intra-Aortic Balloon Pump Insertion;   Surgeon: Enoch Leong MD;  Location:  HEART CARDIAC CATH LAB     CV LEFT VENTRICULOGRAM N/A 8/7/2019    Procedure: Left Ventriculogram;  Surgeon: Enoch Leong MD;  Location:  HEART CARDIAC CATH LAB     CV PCI ASPIRATION THROMECTOMY N/A 8/7/2019    Procedure: PCI Aspiration Thrombectomy;  Surgeon: Enoch Leong MD;  Location:  HEART CARDIAC CATH LAB     CV PCI STENT DRUG ELUTING N/A 8/7/2019    Procedure: PCI Stent Drug Eluting;  Surgeon: Enoch Leong MD;  Location:  HEART CARDIAC CATH LAB       Prior to Admission Medications   Prior to Admission Medications   Prescriptions Last Dose Informant Patient Reported? Taking?   ibuprofen (ADVIL/MOTRIN) 200 MG tablet 8/7/2019 at Unknown time Self Yes Yes   Sig: Take 400-600 mg by mouth every 4 hours as needed for mild pain   multivitamin w/minerals (MULTI-VITAMIN) tablet 8/7/2019 at Unknown time Self Yes Yes   Sig: Take 1 tablet by mouth daily      Facility-Administered Medications: None     Allergies   No Known Allergies    Social History   I have reviewed this patient's social history and updated it with pertinent information if needed. Gilbert Dawson  reports that he has been smoking cigarettes.  He has been smoking about 0.50 packs per day. He does not have any smokeless tobacco history on file. He reports that he drinks alcohol.    Family History   I have reviewed this patient's family history and updated it with pertinent information if needed.   Family History   Problem Relation Age of Onset     Alzheimer Disease Father        Review of Systems   The 10 point Review of Systems is negative other than noted in the HPI    Physical Exam   Temp: 98.7  F (37.1  C) Temp src: Oral BP: 131/85 Pulse: 86 Heart Rate: 86 Resp: 28 SpO2: 98 % O2 Device: None (Room air)    Vital Signs with Ranges  Temp:  [98  F (36.7  C)-98.7  F (37.1  C)] 98.7  F (37.1  C)  Pulse:  [64-89] 86  Heart Rate:  [] 86  Resp:  [8-31] 28  BP: ()/()  131/85  SpO2:  [95 %-100 %] 98 %  212 lbs 4.85 oz    Constitutional: Awake, alert, cooperative, no apparent distress.  Eyes: Conjunctiva and pupils examined and normal.  HEENT: Moist mucous membranes, normal dentition.  Respiratory: Clear to auscultation bilaterally, no crackles or wheezing.  Cardiovascular: Regular rate and rhythm, normal S1 and S2, and no murmur noted.  GI: Soft, non-distended, non-tender, normal bowel sounds.  Lymph/Hematologic: No anterior cervical or supraclavicular adenopathy.  Skin: No rashes, no cyanosis, no edema. Right groin access site without hematoma, small bruising, soft.  Musculoskeletal: No joint swelling, erythema or tenderness.  Neurologic: Cranial nerves 2-12 intact, normal strength and sensation.  Psychiatric: Alert, oriented to person, place and time, no obvious anxiety or depression.    Data   -Data reviewed today: All pertinent laboratory and imaging results from this encounter were reviewed. I personally reviewed no images or EKG's today.    Recent Labs   Lab 08/09/19  0400 08/08/19  1840 08/08/19  1218 08/08/19  0600  08/07/19  2100 08/07/19  2059   WBC 11.3*  --   --  12.8*  --  12.7*  --    HGB 12.4*  --   --  13.7  --  16.0  --    MCV 98  --   --  97  --  97  --    *  --   --  218  --  270  --    INR  --   --   --   --   --  1.08  --      --   --  141  --  141  --    POTASSIUM 4.1  --   --  4.6  --  4.7  --    CHLORIDE 110*  --   --  111*  --  108  --    CO2 26  --   --  24  --  28  --    BUN 17  --   --  21  --  26  --    CR 0.86  --   --  0.89  --  0.97  --    ANIONGAP 3  --   --  6  --  5  --    JOSE JUAN 7.7*  --   --  8.6  --  9.7  --    *  --   --  115*  --  106*  --    TROPI 39.959* 71.030* 85.977* 54.760*   < > 1.475*  --    TROPONIN  --   --   --   --   --   --  0.64*    < > = values in this interval not displayed.       No results found for this or any previous visit (from the past 24 hour(s)).

## 2019-08-10 NOTE — PLAN OF CARE
OT/CR: Evaluation and treatment initiated. Pt lives in a rambler style home with his spouse. Prior pt independent in all I/ADLs.   Discharge Planner OT   Patient plan for discharge: Home  Current status: While standing, pt completed 3 standing calisthenic exercises for 2 sets of 15 reps each exercise. Pt ambulated in the hallway with independence for 3 minutes. Pt did not report any symptoms and tolerated session well.   Barriers to return to prior living situation: none anticipated   Recommendations for discharge: Home with assist as needed for I/ADLs, and OP CR   Rationale for recommendations: OP CR for monitored and progressive physical activity and education.        Entered by: Tuyet Wagoner 08/10/2019 11:53 AM

## 2019-08-10 NOTE — PROGRESS NOTES
08/10/19 1100   Quick Adds   Type of Visit Initial Occupational Therapy Evaluation   Living Environment   Lives With spouse   Living Arrangements house  (Rambler style home with lower level )   Home Accessibility stairs to enter home;stairs within home   Number of Stairs, Main Entrance 1   Number of Stairs, Within Home, Primary   (15 to lower level )   Transportation Anticipated family or friend will provide  (Pt typically drives )   Self-Care   Usual Activity Tolerance good   Current Activity Tolerance moderate   Equipment Currently Used at Home none   Activity/Exercise/Self-Care Comment Pt reported that he is active throughout the day.    Functional Level   Ambulation 0-->independent   Transferring 0-->independent   Toileting 0-->independent   Bathing 0-->independent   Dressing 0-->independent   Fall history within last six months no   General Information   Onset of Illness/Injury or Date of Surgery - Date 08/07/19   Referring Physician Enoch Leong MD   Patient/Family Goals Statement Home    Additional Occupational Profile Info/Pertinent History of Current Problem Per chart: Gilbert Dawson is a 58 year old male who was admitted on 8/7/2019. Inferior ST segment elevation myocardial infarction with involvement of the anterior segments due to right-to-left collateral involvement status post RCA PCI.  of proximal LAD and large diagonal, 60% proximal circumflex disease. Cardiogenic shock- resolving. See chart for details.    Heart Disease Risk Factors Lack of physical activity   Cognitive Status Examination   Orientation orientation to person, place and time   Level of Consciousness alert   Sensory Examination   Sensory Quick Adds No deficits were identified   Pain Assessment   Patient Currently in Pain No   Range of Motion (ROM)   ROM Comment LUE with past injury and per pt will need future surgery. BUE WFL   Mobility   Bed Mobility Bed mobility skill: Sit to supine;Bed mobility skill: Supine to sit  "  Bed Mobility Skill: Supine to Sit   Level of [place]: Supine/Sit independent   Transfer Skills   Transfer Transfer Safety Analysis Bed/Chair;Transfer Skill: Stand to Sit;Transfer Safety Analysis Sit/Stand   Transfer Skill: Bed to Chair/Chair to Bed   Level of [place]: Bed to Chair independent   Transfer Skill: Sit to Stand   Level of [place]: Sit/Stand independent   Instrumental Activities of Daily Living (IADL)   Previous Responsibilities meal prep;housekeeping;laundry;shopping;yardwork;medication management;finances;driving;work   General Therapy Interventions   Planned Therapy Interventions ADL retraining;IADL retraining;risk factor education;progressive activity/exercise;home program guidelines;transfer training   Clinical Impression   Criteria for Skilled Therapeutic Interventions Met yes, treatment indicated   OT Diagnosis Decreased activity tolerance in I/ADls    Influenced by the following impairments Decreased activity tolerance in I/ADls    Assessment of Occupational Performance 1-3 Performance Deficits   Identified Performance Deficits Decreased activity tolerance in I/ADls (dressing, bathing, toileting)   Clinical Decision Making (Complexity) Low complexity   Therapy Frequency 2x/day   Predicted Duration of Therapy Intervention (days/wks) 4 days   Anticipated Discharge Disposition Home with Outpatient Therapy  (OP CR )   Risks and Benefits of Treatment have been explained. Yes   Patient, Family & other staff in agreement with plan of care Yes   [facility] AM-PAC TM \"6 Clicks\"   2016, Trustees of [facility], under license to [employer].  All rights reserved.   6 Clicks Short Forms Daily Activity Inpatient Short Form   [facility] AM-PAC  \"6 Clicks\" Daily Activity Inpatient Short Form   1. Putting on and taking off regular lower body clothing? 3 - A Little   2. Bathing (including washing, rinsing, drying)? 3 - A Little   3. Toileting, which includes using toilet, " bedpan or urinal? 3 - A Little   4. Putting on and taking off regular upper body clothing? 4 - None   5. Taking care of personal grooming such as brushing teeth? 4 - None   6. Eating meals? 4 - None   Daily Activity Raw Score (Score out of 24.Lower scores equate to lower levels of function) 21   Total Evaluation Time   Total Evaluation Time (Minutes) 8

## 2019-08-10 NOTE — PLAN OF CARE
Alert and oriented x 4. VS stable, on RA and no complaints of pain. He was up walking in the halls once this afternoon and tolerated that activity well. Plan for possible discharge tomorrow.

## 2019-08-10 NOTE — PROGRESS NOTES
[facility]    Cardiology Progress Note     Assessment & Plan   Gilbert Dawson is a 58 year old male who was admitted on 8/7/2019.     1.  Inferior ST segment elevation myocardial infarction with involvement of the anterior segments due to right-to-left collateral involvement status post RCA PCI  2.   of proximal LAD and large diagonal, 60% proximal circumflex disease  3.  Cardiogenic shock- resolving  4.  Small muscular VSD with a Qp/Qs of 1.3 on echocardiogram in 2016  5.  Mild pulmonary stenosis mean gradient of 24 mmHg in 2016  6.  Ischemic cardiomyopathy with ejection fraction of less than 30%  7.  Paroxysmal atrial fibrillation  8.  Right bundle branch block  9.  Tobacco abuse    He continues to rapidly improve.  He is off all vasopressor support and the balloon pump was removed yesterday.  His systolic blood pressures are in the 120s this morning.  He remains chest pain-free.  No significant arrhythmia events on telemetry overnight.    I discussed with him that at this point I would like to uptitrate his beta-blocker therapy to metoprolol tartrate 25 mg twice a day and added lisinopril 2.5 mg daily.  Given no further arrhythmia events, will discontinue the amiodarone.  We will plan to continue to observe him for at least the next 24 hours.  He no longer requires ICU level cares.    Cardiology will continue to follow along.  Please page with any questions or concerns.    Delmar Watson MD    Interval History   No overnight events.  Pressors have all been weaned off.  No chest pain or chest discomfort.  No shortness of breath or dyspnea on exertion.    Physical Exam   Temp: 98.2  F (36.8  C) Temp src: Oral BP: 117/73 Pulse: 84 Heart Rate: 80 Resp: 18 SpO2: 98 % O2 Device: None (Room air) Oxygen Delivery: 2 LPM  Vitals:    08/08/19 0215 08/09/19 0100 08/10/19 0000   Weight: 96.3 kg (212 lb 4.9 oz) 95.8 kg (211 lb 3.2 oz) 96.3 kg (212 lb 4.9 oz)     Vital Signs with Ranges  Temp:  [98  F  (36.7  C)-98.6  F (37  C)] 98.2  F (36.8  C)  Pulse:  [64-89] 84  Heart Rate:  [64-87] 80  Resp:  [8-24] 18  BP: ()/() 117/73  SpO2:  [94 %-100 %] 98 %  I/O last 3 completed shifts:  In: 2362.14 [P.O.:860; I.V.:1502.14]  Out: 1150 [Urine:1150]    Constitutional: No apparent distress.   Eyes: No xanthelasma or conjunctivitis  Respiratory: Clear to auscultation bilaterally. No crackles or wheezes.  Cardiovascular: Regular rate and rhythm. Normal S1 and S2. No murmurs. No extra heart sounds. No JVD.   Extremities: No peripheral edema.  Right groin site is clean and dry with dressing intact.  No hematoma or significant bruising.  Neurologic: Moving all extremities. No facial assymmetry.  Psychiatric: Alert and oriented. Answers questions appropriately.     Medications     DOPamine Stopped (08/07/19 2246)     norepinephrine Stopped (08/08/19 1455)     Percutaneous Coronary Intervention orders placed (this is information for BPA alerting)       ACE/ARB/ARNI NOT PRESCRIBED       BETA BLOCKER NOT PRESCRIBED       sodium chloride Stopped (08/08/19 1153)       acetaminophen  975 mg Oral Q6H     amiodarone  400 mg Oral Daily     aspirin  81 mg Oral Daily     atorvastatin  80 mg Oral Daily     cyclobenzaprine  10 mg Oral TID     lidocaine  1-3 patch Transdermal Q24H     lidocaine   Transdermal Q8H     lidocaine   Transdermal Q24h     lisinopril  2.5 mg Oral Daily     metoprolol tartrate  25 mg Oral BID     ticagrelor  90 mg Oral Q12H       Data   Results for orders placed or performed during the hospital encounter of 08/07/19 (from the past 24 hour(s))   Activated clotting time POCT   Result Value Ref Range    Activated Clot Time 90 75 - 150 sec   Glucose by meter   Result Value Ref Range    Glucose 103 (H) 70 - 99 mg/dL   Pharmacy Liaison for Medication Coverage    Lennie Bryant     8/9/2019  3:10 PM  Medication coverage check for Brilinta. $162 monthly with coupon   card.    Lennie Simons  CphT  [facility] Discharge Pharmacy Liaison  Liaison Cell: [phone]   Glucose by meter   Result Value Ref Range    Glucose 88 70 - 99 mg/dL

## 2019-08-10 NOTE — PLAN OF CARE
Vss, Teli SR with pac's, A&OX4 and  denies any pain. Right groin site CDI. Up independently room. Lisinopril started and metoprolol dose was increased per cards.

## 2019-08-11 VITALS
BODY MASS INDEX: 28.14 KG/M2 | DIASTOLIC BLOOD PRESSURE: 75 MMHG | OXYGEN SATURATION: 100 % | HEIGHT: 73 IN | SYSTOLIC BLOOD PRESSURE: 110 MMHG | WEIGHT: 212.3 LBS | TEMPERATURE: 98.4 F | HEART RATE: 86 BPM | RESPIRATION RATE: 14 BRPM

## 2019-08-11 LAB
ANION GAP SERPL CALCULATED.3IONS-SCNC: 7 MMOL/L (ref 3–14)
BUN SERPL-MCNC: 17 MG/DL (ref 7–30)
CALCIUM SERPL-MCNC: 8 MG/DL (ref 8.5–10.1)
CHLORIDE SERPL-SCNC: 111 MMOL/L (ref 94–109)
CO2 SERPL-SCNC: 22 MMOL/L (ref 20–32)
CREAT SERPL-MCNC: 0.76 MG/DL (ref 0.66–1.25)
ERYTHROCYTE [DISTWIDTH] IN BLOOD BY AUTOMATED COUNT: 12.9 % (ref 10–15)
GFR SERPL CREATININE-BSD FRML MDRD: >90 ML/MIN/{1.73_M2}
GLUCOSE SERPL-MCNC: 98 MG/DL (ref 70–99)
HCT VFR BLD AUTO: 37 % (ref 40–53)
HGB BLD-MCNC: 13 G/DL (ref 13.3–17.7)
MAGNESIUM SERPL-MCNC: 2 MG/DL (ref 1.6–2.3)
MCH RBC QN AUTO: 33.3 PG (ref 26.5–33)
MCHC RBC AUTO-ENTMCNC: 35.1 G/DL (ref 31.5–36.5)
MCV RBC AUTO: 95 FL (ref 78–100)
PLATELET # BLD AUTO: 142 10E9/L (ref 150–450)
POTASSIUM SERPL-SCNC: 3.8 MMOL/L (ref 3.4–5.3)
RBC # BLD AUTO: 3.9 10E12/L (ref 4.4–5.9)
SODIUM SERPL-SCNC: 140 MMOL/L (ref 133–144)
WBC # BLD AUTO: 9 10E9/L (ref 4–11)

## 2019-08-11 PROCEDURE — 25000132 ZZH RX MED GY IP 250 OP 250 PS 637: Performed by: HOSPITALIST

## 2019-08-11 PROCEDURE — 80048 BASIC METABOLIC PNL TOTAL CA: CPT | Performed by: HOSPITALIST

## 2019-08-11 PROCEDURE — 99239 HOSP IP/OBS DSCHRG MGMT >30: CPT | Performed by: HOSPITALIST

## 2019-08-11 PROCEDURE — 36415 COLL VENOUS BLD VENIPUNCTURE: CPT | Performed by: HOSPITALIST

## 2019-08-11 PROCEDURE — 85027 COMPLETE CBC AUTOMATED: CPT | Performed by: HOSPITALIST

## 2019-08-11 PROCEDURE — 99232 SBSQ HOSP IP/OBS MODERATE 35: CPT | Performed by: INTERNAL MEDICINE

## 2019-08-11 PROCEDURE — 83735 ASSAY OF MAGNESIUM: CPT | Performed by: HOSPITALIST

## 2019-08-11 RX ORDER — NITROGLYCERIN 0.4 MG/1
TABLET SUBLINGUAL
Qty: 15 TABLET | Refills: 0 | Status: SHIPPED | OUTPATIENT
Start: 2019-08-11

## 2019-08-11 RX ORDER — METOPROLOL TARTRATE 25 MG/1
25 TABLET, FILM COATED ORAL 2 TIMES DAILY
Qty: 60 TABLET | Refills: 0 | Status: SHIPPED | OUTPATIENT
Start: 2019-08-11

## 2019-08-11 RX ORDER — LISINOPRIL 5 MG/1
5 TABLET ORAL DAILY
Qty: 30 TABLET | Refills: 0 | Status: SHIPPED | OUTPATIENT
Start: 2019-08-12

## 2019-08-11 RX ORDER — ATORVASTATIN CALCIUM 80 MG/1
80 TABLET, FILM COATED ORAL DAILY
Qty: 30 TABLET | Refills: 0 | Status: SHIPPED | OUTPATIENT
Start: 2019-08-11

## 2019-08-11 RX ADMIN — LISINOPRIL 5 MG: 5 TABLET ORAL at 08:01

## 2019-08-11 RX ADMIN — ATORVASTATIN CALCIUM 80 MG: 40 TABLET, FILM COATED ORAL at 08:01

## 2019-08-11 RX ADMIN — ASPIRIN 81 MG: 81 TABLET, COATED ORAL at 08:00

## 2019-08-11 RX ADMIN — METOPROLOL TARTRATE 25 MG: 25 TABLET ORAL at 08:01

## 2019-08-11 RX ADMIN — TICAGRELOR 90 MG: 90 TABLET ORAL at 08:01

## 2019-08-11 NOTE — PLAN OF CARE
A&Ox4, VSS. Denies pain throughout shift. Tele SR with PACs. R groin site WDL, CMS intact. Independent OOB to bathroom, voiding spontaneously. Can make needs known.

## 2019-08-11 NOTE — PLAN OF CARE
OT/CR: Pt preparing for discharge at this time.     Occupational Therapy Discharge Summary    Reason for therapy discharge:    Discharging home    Progress towards therapy goal(s). See goals on Care Plan in [facility] electronic health record for goal details.  Goals partially met     Therapy recommendation(s):    Home with assist as needed for I/ADLs, and OP CR

## 2019-08-11 NOTE — PLAN OF CARE
Alert and oriented x 4. VS stable, on RA and no complaints of pain. Patient was discharged this afternoon with family as transport home. 6 new medications were filled here and sent home with the patient. I reviewed all DC paperwork, new medications, orders and appointments with the patient and family, all were able to verbalize understanding of teaching. All questions answered. All IVs were removed and patient had all belongings at the time of DC.

## 2019-08-11 NOTE — DISCHARGE INSTRUCTIONS
Discharge Instructions for Cardiac Catheterization  Cardiac catheterization is a procedure to look for blocked areas in the blood vessels that send blood to the heart. A thin, flexible tube (catheter) is put in a blood vessel in your groin or arm. The healthcare provider injects contrast fluid into your blood, which then flows to your heart. X-rays pictures are taken of your heart. Your provider will review the results with you. Be sure to ask any questions you have before you leave. This sheet will help you take care of yourself at home.  Home care    Don't drive or make any important decisions for at least 24 hours after receiving any type of sedation or anesthesia.     Arrange to have a responsible adult drive you home after your procedure.    Only do light and easy activities for the next 2 to 3 days. Ask for help with chores and errands while you recover. Have someone drive you to your appointments.    Don't lift anything heavy for a while. Your healthcare team will tell you when it's safe to lift again.    Ask your healthcare team when you can expect to return to work. Unless your job involves lifting, you may be able to return to your normal activities within a couple of days.    Take your medicines as directed. Don't skip doses.    Drink 6 to 8 glasses of water a day. This is to help flush the contrast dye out of your body. Call your healthcare team if your urine has any change in color.    Take your temperature each day for 7 days. If you feel cold and clammy or start sweating, take your temperature right away and call your healthcare team.    Check your incisions every day for signs of infection. These include redness, swelling, and drainage. It is normal to have a small bruise or bump where the catheter was inserted. A bruise that is getting larger is not normal and should be reported to your healthcare team. If you see blood forming in the incision, call your healthcare team. Go to the emergency  department if you have uncontrolled bleeding from the artery site. This is especially true if you take medicines that make it hard for your blood to clot. Examples are aspirin, clopidogrel, and warfarin.    Eat a healthy diet. Make sure it is low in fat, salt, and cholesterol. Ask your healthcare team for diet information.    Stop smoking. Enroll in a stop-smoking program or ask your healthcare team for help. Stop-smoking programs can be life saving.    Exercise as your healthcare team tells you to. Your healthcare team may recommend you start a cardiac rehabilitation program. Cardiac rehab is an exercise program in which trained healthcare staff watch your progress and stress on your heart while you exercise. Ask your team how to enroll.    Don't swim or take baths until your healthcare team says it s OK. You can shower the day after the procedure. Keep the site clean and dry. This keeps the incision from getting wet and infected until the skin and artery can heal.    Be sure to follow all after-care instructions.   Follow-up care    Make a follow-up appointment as advised by our staff. It's common to have a follow-up appointment 2 to 4 weeks after an angioplasty or coronary stent procedure.    Make a yearly appointment, too. This is to make sure you are still doing well and not having any new symptoms.    Don't wait for a follow-up appointment if your medicines aren't working or you are having heart-related symptoms.  When to seek medical care  Call your healthcare provider right away if you have any of the following:    Chest pain    Constant or increasing pain or numbness in your leg    Fever of 100.4 F (38.0 C) or higher, or as directed by your healthcare provider    Symptoms of infection. These include redness, swelling, drainage, or warmth at the incision site.    Shortness of breath    A leg that feels cold or appears blue    Bleeding, bruising, or a lot of swelling where the catheter was inserted    Blood  in your urine    Black or tarry stools    Any unusual bleeding   Date Last Reviewed: 10/1/2016    6748-0450 [employer]. [address] [place] [address], [place] [address]. All rights reserved. This information is not intended as a substitute for professional medical care. Always follow your healthcare professional's instructions.

## 2019-08-11 NOTE — DISCHARGE SUMMARY
[facility]    Discharge Summary  Hospitalist    Date of Admission:  8/7/2019  Date of Discharge:  8/11/2019  Discharging Provider: Shilpi Padilla DO  Date of Service (when I saw the patient): 08/11/19    Discharge Diagnoses   Inferior STEMI s/p stent x2   of proximal LAD and large diagonal, 60% proximal circumflex disease  Cardiogenic shock- resolving  Small muscular VSD  Mild pulmonary stenosis  Ischemic cardiomyopathy with EF <30%  Paroxysmal atrial fibrillation  Tobacco abuse  Chronic left shoulder pain  Suspected sleep apnea    History of Present Illness   Gilbert Dawson is an 58 year old male who presented with STEMI, underwent emergent thrombectomy and stenting x2 with cardiology, required IABP for cardiogenic shock.    Hospital Course   Gilbert Dawson was admitted on 8/7/2019.  The following problems were addressed during his hospitalization:    Inferior STEMI s/p stent x2   of proximal LAD and large diagonal, 60% proximal circumflex disease  Cardiogenic shock- resolving  Small muscular VSD  Mild pulmonary stenosis  Ischemic cardiomyopathy with EF <30%  Required IABP for cardiogenic shock. Had stent x2 to RCA and thrombectomy emergently for his STEMI (was noted to have right to left collateral involvement). Was monitored in the ICU until 8/10 when he was weaned off pressors, amiodarone and IABP was discontinued.  - Atorvastatin 80mg, 30 day supply provided on discharge  - DAPT with brilinta BID and ASA 81mg daily. He plans to continue brilinta for at least one month (due to cost), will need to have conversation regarding switch to plavix in the future.  - Metoprolol tartrate 25mg BID and lisinopril 5mg, 30 day supply provided on discharge  - Further follow up with cardiothoracic surgery in the future, undergo some viability testing with cardiac MRI after discharge to assess LAD.  -Cardiac rehab  - Follow up with cardiology, Dr Spivey     Paroxysmal atrial  fibrillation  CHADSVASC=1. Had been having runs of atrial fibrillation in the setting of cardiogenic shock. Per cardiology, no plans for anticoagulation at this time.  - Continue dual antiplatelet for above  - 14 day ZIO patch on discharge, placed with cardiology in follow up.     Tobacco abuse  Half pack per day. Encourage smoking cessation  - He declines nicotine patch     Chronic left shoulder pain  - Continue to follow with Dr Aquino, tylenol PRN pain    Suspected sleep apnea  - Wife suspicious of sleep apnea due to snoring, daytime sleepiness  - Recommend outpatient sleep study once out of acute stage with his heart.    Shilpi Padilla, DO    Significant Results and Procedures    Cardiac catheterization 8/7/19:     The ejection fraction is 20-30% by visual estimate.    Left ventricular filling pressures are severely elevated .    Dist LM lesion is 20% stenosed.    Ost LAD lesion is 100% stenosed.    Ost 1st Diag lesion is 100% stenosed.    Mid Cx lesion is 45% stenosed.    Prox RCA lesion is 60% stenosed.    Mid RCA lesion is 100% stenosed.    Acute Mrg lesion is 100% stenosed.    Pending Results   NA    Code Status   Full Code       Primary Care Physician   [facility]    Physical Exam   Temp: 98.4  F (36.9  C) Temp src: Oral BP: 110/75 Pulse: 86 Heart Rate: 72 Resp: 14 SpO2: 100 % O2 Device: None (Room air)    Vitals:    08/08/19 0215 08/09/19 0100 08/10/19 0000   Weight: 96.3 kg (212 lb 4.9 oz) 95.8 kg (211 lb 3.2 oz) 96.3 kg (212 lb 4.9 oz)     Vital Signs with Ranges  Temp:  [97.5  F (36.4  C)-98.7  F (37.1  C)] 98.4  F (36.9  C)  Pulse:  [86-87] 86  Heart Rate:  [] 72  Resp:  [14-31] 14  BP: (110-152)/(75-92) 110/75  SpO2:  [96 %-100 %] 100 %  No intake/output data recorded.    Constitutional: Awake, alert, cooperative, no apparent distress.  Eyes: Conjunctiva and pupils examined and normal.  HEENT: Moist mucous membranes, normal dentition.  Respiratory: Clear to auscultation  bilaterally, no crackles or wheezing.  Cardiovascular: Regular rate and rhythm, normal S1 and S2, and no murmur noted.  GI: Soft, non-distended, non-tender, normal bowel sounds.  Lymph/Hematologic: No anterior cervical or supraclavicular adenopathy.  Skin: No rashes, no cyanosis, no edema. No hematoma at right groin site.  Musculoskeletal: No joint swelling, erythema or tenderness.  Neurologic: Cranial nerves 2-12 intact, normal strength and sensation.  Psychiatric: Alert, oriented to person, place and time, no obvious anxiety or depression.    Discharge Disposition   Discharged to home  Condition at discharge: Stable    Consultations This Hospital Stay   PHARMACY IP CONSULT  NUTRITION SERVICES ADULT IP CONSULT  CARDIAC REHAB IP CONSULT  PHARMACY IP CONSULT  CARDIOTHORACIC SURGERY IP CONSULT  CARDIOLOGY IP CONSULT  INTENSIVIST IP CONSULT  PHARMACY LIAISON FOR MEDICATION COVERAGE CONSULT  SMOKING CESSATION PROGRAM IP CONSULT    Time Spent on this Encounter   I, Shilpi Padilla, personally saw the patient today and spent greater than 30 minutes discharging this patient.    Discharge Orders      MRI Cardiac w/contrast & stress & flow    Results to Dr. Spivey.     CARDIAC REHAB REFERRAL      Follow-Up with Cardiologist      SLEEP EVALUATION & MANAGEMENT REFERRAL - [facility] [phone]  (Age 18 and up)      Reason for your hospital stay    Heart attack requiring two stents     Activity    Your activity upon discharge: activity as tolerated     Follow-up and recommended labs and tests     Follow up with primary care provider, [facility], within 7 days to evaluate medication change and for hospital follow- up.  The following labs/tests are recommended: CBC, BMP within one week.    Follow up with cardiology within one week for Zio Patch application for monitoring your heart rate.    Follow up with cardiothoracic surgery pending results of your cardiac MRI     Full Code     Zio  Patch Holter Adult Pediatric Greater than 48 hrs     Diet    Follow this diet upon discharge: heart healthy diet     Discharge Medications   Current Discharge Medication List      START taking these medications    Details   acetaminophen (TYLENOL) 325 MG tablet Take 3 tablets (975 mg) by mouth every 6 hours as needed for mild pain    Associated Diagnoses: Acute myocardial infarction, initial episode of care (H)      aspirin (ASA) 81 MG EC tablet Take 1 tablet (81 mg) by mouth daily  Qty: 30 tablet, Refills: 0    Comments: Future refills by PCP Dr. [facility] with phone number [phone].  Associated Diagnoses: Acute myocardial infarction, initial episode of care (H)      atorvastatin (LIPITOR) 80 MG tablet Take 1 tablet (80 mg) by mouth daily  Qty: 30 tablet, Refills: 0    Comments: Future refills by PCP [facility] with phone number [phone].  Associated Diagnoses: Acute myocardial infarction, initial episode of care (H)      lisinopril (PRINIVIL/ZESTRIL) 5 MG tablet Take 1 tablet (5 mg) by mouth daily  Qty: 30 tablet, Refills: 0    Comments: Future refills by PCP [facility] with phone number [phone].  Associated Diagnoses: Acute myocardial infarction, initial episode of care (H)      metoprolol tartrate (LOPRESSOR) 25 MG tablet Take 1 tablet (25 mg) by mouth 2 times daily  Qty: 60 tablet, Refills: 0    Comments: Future refills by PCP [facility] with phone number [phone].  Associated Diagnoses: Acute myocardial infarction, initial episode of care (H)      nitroGLYcerin (NITROSTAT) 0.4 MG sublingual tablet For chest pain place 1 tablet under the tongue every 5 minutes for 3 doses. If symptoms persist 5 minutes after 1st dose call 911.  Qty: 15 tablet, Refills: 0    Comments: Future refills by PCP [facility] with phone number [phone].  Associated Diagnoses: Acute myocardial infarction, initial episode of care (H)       ticagrelor (BRILINTA) 90 MG tablet Take 1 tablet (90 mg) by mouth every 12 hours  Qty: 60 tablet, Refills: 0    Comments: Future refills by PCP [facility] with phone number [phone].  Associated Diagnoses: Acute myocardial infarction, initial episode of care (H)         CONTINUE these medications which have NOT CHANGED    Details   multivitamin w/minerals (MULTI-VITAMIN) tablet Take 1 tablet by mouth daily         STOP taking these medications       ibuprofen (ADVIL/MOTRIN) 200 MG tablet Comments:   Reason for Stopping:             Allergies   No Known Allergies  Data   Most Recent 3 CBC's:  Recent Labs   Lab Test 08/11/19  0529 08/09/19  0400 08/08/19  0600   WBC 9.0 11.3* 12.8*   HGB 13.0* 12.4* 13.7   MCV 95 98 97   * 139* 218      Most Recent 3 BMP's:  Recent Labs   Lab Test 08/11/19  0529 08/09/19  0400 08/08/19  0600    139 141   POTASSIUM 3.8 4.1 4.6   CHLORIDE 111* 110* 111*   CO2 22 26 24   BUN 17 17 21   CR 0.76 0.86 0.89   ANIONGAP 7 3 6   JOSE JUAN 8.0* 7.7* 8.6   GLC 98 106* 115*     Most Recent 2 LFT's:No lab results found.  Most Recent INR's and Anticoagulation Dosing History:  Anticoagulation Dose History     Recent Dosing and Labs Latest Ref Rng & Units 8/7/2019    INR 0.86 - 1.14 1.08        Most Recent 3 Troponin's:  Recent Labs   Lab Test 08/09/19  0400 08/08/19  1840 08/08/19  1218  08/07/19  2059   TROPI 39.959* 71.030* 85.977*   < >  --    TROPONIN  --   --   --   --  0.64*    < > = values in this interval not displayed.     Most Recent Cholesterol Panel:  Recent Labs   Lab Test 08/08/19  0104   CHOL 144   LDL 92   HDL 45   TRIG 33     Most Recent 6 Bacteria Isolates From Any Culture (See EPIC Reports for Culture Details):No lab results found.  Most Recent TSH, T4 and A1c Labs:No lab results found.  Results for orders placed or performed during the hospital encounter of 08/07/19   XR Chest Port 1 View    Narrative    EXAM: XR CHEST PORT 1 VW  LOCATION:  [facility]  DATE/TIME: 8/7/2019 8:57 PM    INDICATION: Myocardial infarction  COMPARISON: None    FINDINGS: Negative chest.

## 2019-08-12 ENCOUNTER — CARE COORDINATION (OUTPATIENT)
Dept: CARDIOLOGY | Facility: CLINIC | Age: 59
End: 2019-08-12

## 2019-08-12 DIAGNOSIS — Z09 HOSPITAL DISCHARGE FOLLOW-UP: Primary | ICD-10-CM

## 2019-08-12 NOTE — PROGRESS NOTES
RN called pt and left a message to call RN back.     8/13/19 RN  Spoke with patient-Patient was evaluated by cardiology while inpatient. Called patient to discuss any post hospital d/c questions, review medication changes, and confirm f/u appts. RN confirmed patient was d/c with the antiplatelet Brilinta. Pt has an Rx for PRN SL Nitroglycerin. Patient denied any questions regarding new medications or changes to PTA medications. Patient denied chest pain, fever or light headedness. Right groin cardiac cath site is without bleeding, swelling, redness or tenderness. Patient to be followed at the Cardiology Clinic at [facility]. Patient went to Urgent Care at [facility] for SOB yesterday. After evaluation  Patient was placed on a diuretic. Cardiac rehab is scheduled on 8/16/19. Patient advised to call clinic with any cardiac related questions or concerns prior to this angel't. Patient verbalized understanding and agreed with plan.     Cardiology Progress note 8/11/19    Assessment & Plan     Gilbert Dawson is a 58 year old male who was admitted on 8/7/2019.      1.  Inferior ST segment elevation myocardial infarction with involvement of the anterior segments due to right-to-left collateral involvement status post RCA PCI  2.   of proximal LAD and large diagonal, 60% proximal circumflex disease  3.  Cardiogenic shock- resolving  4.  Small muscular VSD with a Qp/Qs of 1.3 on echocardiogram in 2016  5.  Mild pulmonary stenosis mean gradient of 24 mmHg in 2016  6.  Ischemic cardiomyopathy with ejection fraction of less than 30%  7.  Paroxysmal atrial fibrillation  8.  Right bundle branch block  9.  Tobacco abuse     He is doing well today.  He remains chest pain-free.  Blood pressures are stable with the lisinopril and metoprolol.  He is anticipating dismissal from the hospital today.     We will plan for dismissal from the hospital today.  I will set up outpatient follow-up with Dr. Spivey as requested.  We will  obtain a ZIO Patch to assess for atrial fibrillation to determine if this was an isolated episode in the setting of acute illness or whether or not he has ongoing atrial fibrillation.  We will also order a cardiac MRI to assess cardiac viability in the LAD distribution.     Dismissal medications will be aspirin 81 mg daily, Brilinta 90 mg twice a day, atorvastatin 80 mg daily, lisinopril 5 mg daily, and metoprolol tartrate 25 mg twice a day.  His lisinopril and metoprolol can be uptitrated in the outpatient setting given his reduced ejection fraction.  If he continues to have significantly reduced ejection fraction at least 40 days from now despite intervention, he would be a candidate for ICD implantation in the future.     Please page with any questions or concerns prior to his dismissal from the hospital.     Delmar Watson MD

## 2019-08-13 LAB — INTERPRETATION ECG - MUSE: NORMAL

## 2019-08-16 ENCOUNTER — HOSPITAL ENCOUNTER (OUTPATIENT)
Dept: CARDIAC REHAB | Facility: CLINIC | Age: 59
End: 2019-08-16
Attending: INTERNAL MEDICINE
Payer: COMMERCIAL

## 2019-08-16 DIAGNOSIS — I21.9 ACUTE MYOCARDIAL INFARCTION, INITIAL EPISODE OF CARE (H): ICD-10-CM

## 2019-08-16 PROCEDURE — 40000575 ZZH STATISTIC OP CARDIAC VISIT #2

## 2019-08-16 PROCEDURE — 40000116 ZZH STATISTIC OP CR VISIT

## 2019-08-16 PROCEDURE — 93798 PHYS/QHP OP CAR RHAB W/ECG: CPT

## 2019-08-19 ENCOUNTER — HOSPITAL ENCOUNTER (OUTPATIENT)
Dept: CARDIAC REHAB | Facility: CLINIC | Age: 59
End: 2019-08-19
Attending: INTERNAL MEDICINE
Payer: COMMERCIAL

## 2019-08-19 PROCEDURE — 40000116 ZZH STATISTIC OP CR VISIT: Performed by: CLINICAL EXERCISE PHYSIOLOGIST

## 2019-08-19 PROCEDURE — 93798 PHYS/QHP OP CAR RHAB W/ECG: CPT | Performed by: CLINICAL EXERCISE PHYSIOLOGIST

## 2019-08-21 ENCOUNTER — HOSPITAL ENCOUNTER (OUTPATIENT)
Dept: CARDIAC REHAB | Facility: CLINIC | Age: 59
End: 2019-08-21
Attending: INTERNAL MEDICINE
Payer: COMMERCIAL

## 2019-08-21 PROCEDURE — 93798 PHYS/QHP OP CAR RHAB W/ECG: CPT

## 2019-08-21 PROCEDURE — 40000116 ZZH STATISTIC OP CR VISIT

## 2019-08-23 ENCOUNTER — HOSPITAL ENCOUNTER (OUTPATIENT)
Dept: CARDIAC REHAB | Facility: CLINIC | Age: 59
End: 2019-08-23
Attending: INTERNAL MEDICINE
Payer: COMMERCIAL

## 2019-08-23 PROCEDURE — 93798 PHYS/QHP OP CAR RHAB W/ECG: CPT

## 2019-08-23 PROCEDURE — 40000116 ZZH STATISTIC OP CR VISIT

## 2019-08-26 ENCOUNTER — HOSPITAL ENCOUNTER (OUTPATIENT)
Dept: CARDIAC REHAB | Facility: CLINIC | Age: 59
End: 2019-08-26
Attending: INTERNAL MEDICINE
Payer: COMMERCIAL

## 2019-08-26 PROCEDURE — 40000116 ZZH STATISTIC OP CR VISIT: Performed by: CLINICAL EXERCISE PHYSIOLOGIST

## 2019-08-26 PROCEDURE — 93798 PHYS/QHP OP CAR RHAB W/ECG: CPT | Performed by: CLINICAL EXERCISE PHYSIOLOGIST

## 2019-09-04 ENCOUNTER — HOSPITAL ENCOUNTER (OUTPATIENT)
Dept: CARDIAC REHAB | Facility: CLINIC | Age: 59
End: 2019-09-04
Attending: INTERNAL MEDICINE
Payer: COMMERCIAL

## 2019-09-04 PROCEDURE — 40000116 ZZH STATISTIC OP CR VISIT: Performed by: CLINICAL EXERCISE PHYSIOLOGIST

## 2019-09-04 PROCEDURE — 93798 PHYS/QHP OP CAR RHAB W/ECG: CPT | Performed by: CLINICAL EXERCISE PHYSIOLOGIST

## 2019-09-06 ENCOUNTER — HOSPITAL ENCOUNTER (OUTPATIENT)
Dept: CARDIAC REHAB | Facility: CLINIC | Age: 59
End: 2019-09-06
Attending: INTERNAL MEDICINE
Payer: COMMERCIAL

## 2019-09-06 PROCEDURE — 93798 PHYS/QHP OP CAR RHAB W/ECG: CPT | Performed by: OCCUPATIONAL THERAPIST

## 2019-09-06 PROCEDURE — 40000116 ZZH STATISTIC OP CR VISIT: Performed by: OCCUPATIONAL THERAPIST

## 2019-09-09 ENCOUNTER — HOSPITAL ENCOUNTER (OUTPATIENT)
Dept: CARDIAC REHAB | Facility: CLINIC | Age: 59
End: 2019-09-09
Attending: INTERNAL MEDICINE
Payer: COMMERCIAL

## 2019-09-09 PROCEDURE — 40000116 ZZH STATISTIC OP CR VISIT

## 2019-09-09 PROCEDURE — 93798 PHYS/QHP OP CAR RHAB W/ECG: CPT

## 2019-09-11 ENCOUNTER — HOSPITAL ENCOUNTER (OUTPATIENT)
Dept: CARDIAC REHAB | Facility: CLINIC | Age: 59
End: 2019-09-11
Attending: INTERNAL MEDICINE
Payer: COMMERCIAL

## 2019-09-11 PROCEDURE — 93798 PHYS/QHP OP CAR RHAB W/ECG: CPT | Performed by: REHABILITATION PRACTITIONER

## 2019-09-11 PROCEDURE — 40000116 ZZH STATISTIC OP CR VISIT: Performed by: REHABILITATION PRACTITIONER

## 2019-09-13 ENCOUNTER — HOSPITAL ENCOUNTER (OUTPATIENT)
Dept: CARDIAC REHAB | Facility: CLINIC | Age: 59
End: 2019-09-13
Attending: INTERNAL MEDICINE
Payer: COMMERCIAL

## 2019-09-13 PROCEDURE — 93798 PHYS/QHP OP CAR RHAB W/ECG: CPT | Performed by: REHABILITATION PRACTITIONER

## 2019-09-13 PROCEDURE — 40000116 ZZH STATISTIC OP CR VISIT: Performed by: REHABILITATION PRACTITIONER

## 2019-09-16 ENCOUNTER — HOSPITAL ENCOUNTER (OUTPATIENT)
Dept: CARDIAC REHAB | Facility: CLINIC | Age: 59
End: 2019-09-16
Attending: INTERNAL MEDICINE
Payer: COMMERCIAL

## 2019-09-16 PROCEDURE — 93798 PHYS/QHP OP CAR RHAB W/ECG: CPT

## 2019-09-16 PROCEDURE — 40000116 ZZH STATISTIC OP CR VISIT

## 2019-09-18 ENCOUNTER — HOSPITAL ENCOUNTER (OUTPATIENT)
Dept: CARDIAC REHAB | Facility: CLINIC | Age: 59
End: 2019-09-18
Attending: INTERNAL MEDICINE
Payer: COMMERCIAL

## 2019-09-18 PROCEDURE — 40000116 ZZH STATISTIC OP CR VISIT

## 2019-09-18 PROCEDURE — 93798 PHYS/QHP OP CAR RHAB W/ECG: CPT

## 2019-09-20 ENCOUNTER — HOSPITAL ENCOUNTER (OUTPATIENT)
Dept: CARDIAC REHAB | Facility: CLINIC | Age: 59
End: 2019-09-20
Attending: INTERNAL MEDICINE
Payer: COMMERCIAL

## 2019-09-20 PROCEDURE — 40000116 ZZH STATISTIC OP CR VISIT

## 2019-09-20 PROCEDURE — 93798 PHYS/QHP OP CAR RHAB W/ECG: CPT

## 2019-09-23 ENCOUNTER — HOSPITAL ENCOUNTER (OUTPATIENT)
Dept: CARDIAC REHAB | Facility: CLINIC | Age: 59
End: 2019-09-23
Attending: INTERNAL MEDICINE
Payer: COMMERCIAL

## 2019-09-23 PROCEDURE — 40000116 ZZH STATISTIC OP CR VISIT

## 2019-09-23 PROCEDURE — 93798 PHYS/QHP OP CAR RHAB W/ECG: CPT

## 2019-09-25 ENCOUNTER — HOSPITAL ENCOUNTER (OUTPATIENT)
Dept: CARDIAC REHAB | Facility: CLINIC | Age: 59
End: 2019-09-25
Attending: INTERNAL MEDICINE
Payer: COMMERCIAL

## 2019-09-25 PROCEDURE — 40000116 ZZH STATISTIC OP CR VISIT: Performed by: OCCUPATIONAL THERAPIST

## 2019-09-25 PROCEDURE — 93798 PHYS/QHP OP CAR RHAB W/ECG: CPT | Performed by: OCCUPATIONAL THERAPIST

## 2019-09-27 ENCOUNTER — HOSPITAL ENCOUNTER (OUTPATIENT)
Dept: CARDIAC REHAB | Facility: CLINIC | Age: 59
End: 2019-09-27
Attending: INTERNAL MEDICINE
Payer: COMMERCIAL

## 2019-09-27 PROCEDURE — 93798 PHYS/QHP OP CAR RHAB W/ECG: CPT | Performed by: OCCUPATIONAL THERAPIST

## 2019-09-27 PROCEDURE — 40000116 ZZH STATISTIC OP CR VISIT: Performed by: OCCUPATIONAL THERAPIST

## 2019-09-30 ENCOUNTER — HOSPITAL ENCOUNTER (OUTPATIENT)
Dept: CARDIAC REHAB | Facility: CLINIC | Age: 59
End: 2019-09-30
Attending: INTERNAL MEDICINE
Payer: COMMERCIAL

## 2019-09-30 PROCEDURE — 93798 PHYS/QHP OP CAR RHAB W/ECG: CPT

## 2019-09-30 PROCEDURE — 40000116 ZZH STATISTIC OP CR VISIT

## 2019-10-02 ENCOUNTER — HOSPITAL ENCOUNTER (OUTPATIENT)
Dept: CARDIAC REHAB | Facility: CLINIC | Age: 59
End: 2019-10-02
Attending: INTERNAL MEDICINE
Payer: COMMERCIAL

## 2019-10-02 PROCEDURE — 93798 PHYS/QHP OP CAR RHAB W/ECG: CPT | Performed by: CLINICAL EXERCISE PHYSIOLOGIST

## 2019-10-02 PROCEDURE — 40000116 ZZH STATISTIC OP CR VISIT: Performed by: CLINICAL EXERCISE PHYSIOLOGIST

## 2019-10-07 ENCOUNTER — HOSPITAL ENCOUNTER (OUTPATIENT)
Dept: CARDIAC REHAB | Facility: CLINIC | Age: 59
End: 2019-10-07
Attending: INTERNAL MEDICINE
Payer: COMMERCIAL

## 2019-10-07 PROCEDURE — 93798 PHYS/QHP OP CAR RHAB W/ECG: CPT | Performed by: REHABILITATION PRACTITIONER

## 2019-10-07 PROCEDURE — 40000116 ZZH STATISTIC OP CR VISIT: Performed by: REHABILITATION PRACTITIONER

## 2019-10-09 ENCOUNTER — HOSPITAL ENCOUNTER (OUTPATIENT)
Dept: CARDIAC REHAB | Facility: CLINIC | Age: 59
End: 2019-10-09
Attending: INTERNAL MEDICINE
Payer: COMMERCIAL

## 2019-10-09 PROCEDURE — 93798 PHYS/QHP OP CAR RHAB W/ECG: CPT | Performed by: OCCUPATIONAL THERAPIST

## 2019-10-09 PROCEDURE — 40000116 ZZH STATISTIC OP CR VISIT: Performed by: OCCUPATIONAL THERAPIST

## 2019-12-31 ENCOUNTER — TELEPHONE (OUTPATIENT)
Dept: CARDIOLOGY | Facility: CLINIC | Age: 59
End: 2019-12-31

## 2019-12-31 NOTE — TELEPHONE ENCOUNTER
Per CV surgery questioning Pt's F/U did call Pt and he is having surgery, (Bypass) at [facility] at end of month. TYRONE Quinonez RN

## 2020-02-20 ENCOUNTER — HOSPITAL ENCOUNTER (OUTPATIENT)
Dept: CARDIAC REHAB | Facility: CLINIC | Age: 60
End: 2020-02-20
Attending: INTERNAL MEDICINE
Payer: COMMERCIAL

## 2020-02-20 PROCEDURE — 40000575 ZZH STATISTIC OP CARDIAC VISIT #2: Performed by: OCCUPATIONAL THERAPIST

## 2020-02-20 PROCEDURE — 93797 PHYS/QHP OP CAR RHAB WO ECG: CPT | Performed by: OCCUPATIONAL THERAPIST

## 2020-02-20 PROCEDURE — 40000116 ZZH STATISTIC OP CR VISIT: Performed by: OCCUPATIONAL THERAPIST

## 2020-02-20 PROCEDURE — 93798 PHYS/QHP OP CAR RHAB W/ECG: CPT | Performed by: OCCUPATIONAL THERAPIST

## 2020-02-21 ENCOUNTER — HOSPITAL ENCOUNTER (OUTPATIENT)
Dept: CARDIAC REHAB | Facility: CLINIC | Age: 60
End: 2020-02-21
Attending: THORACIC SURGERY (CARDIOTHORACIC VASCULAR SURGERY)
Payer: COMMERCIAL

## 2020-02-21 PROCEDURE — 40000116 ZZH STATISTIC OP CR VISIT

## 2020-02-21 PROCEDURE — 93798 PHYS/QHP OP CAR RHAB W/ECG: CPT

## 2020-02-26 ENCOUNTER — HOSPITAL ENCOUNTER (OUTPATIENT)
Dept: CARDIAC REHAB | Facility: CLINIC | Age: 60
End: 2020-02-26
Attending: THORACIC SURGERY (CARDIOTHORACIC VASCULAR SURGERY)
Payer: COMMERCIAL

## 2020-02-26 PROCEDURE — 93798 PHYS/QHP OP CAR RHAB W/ECG: CPT | Performed by: OCCUPATIONAL THERAPIST

## 2020-02-26 PROCEDURE — 40000116 ZZH STATISTIC OP CR VISIT: Performed by: OCCUPATIONAL THERAPIST

## 2020-02-28 ENCOUNTER — HOSPITAL ENCOUNTER (OUTPATIENT)
Dept: CARDIAC REHAB | Facility: CLINIC | Age: 60
End: 2020-02-28
Attending: THORACIC SURGERY (CARDIOTHORACIC VASCULAR SURGERY)
Payer: COMMERCIAL

## 2020-02-28 PROCEDURE — 40000116 ZZH STATISTIC OP CR VISIT

## 2020-02-28 PROCEDURE — 93798 PHYS/QHP OP CAR RHAB W/ECG: CPT

## 2020-03-04 ENCOUNTER — HOSPITAL ENCOUNTER (OUTPATIENT)
Dept: CARDIAC REHAB | Facility: CLINIC | Age: 60
End: 2020-03-04
Attending: THORACIC SURGERY (CARDIOTHORACIC VASCULAR SURGERY)
Payer: COMMERCIAL

## 2020-03-04 PROCEDURE — 93798 PHYS/QHP OP CAR RHAB W/ECG: CPT | Performed by: CLINICAL EXERCISE PHYSIOLOGIST

## 2020-03-04 PROCEDURE — 40000116 ZZH STATISTIC OP CR VISIT: Performed by: CLINICAL EXERCISE PHYSIOLOGIST

## 2020-03-06 ENCOUNTER — HOSPITAL ENCOUNTER (OUTPATIENT)
Dept: CARDIAC REHAB | Facility: CLINIC | Age: 60
End: 2020-03-06
Attending: THORACIC SURGERY (CARDIOTHORACIC VASCULAR SURGERY)
Payer: COMMERCIAL

## 2020-03-06 PROCEDURE — 93798 PHYS/QHP OP CAR RHAB W/ECG: CPT | Performed by: OCCUPATIONAL THERAPIST

## 2020-03-06 PROCEDURE — 40000116 ZZH STATISTIC OP CR VISIT: Performed by: OCCUPATIONAL THERAPIST

## 2020-12-21 ENCOUNTER — BEH TREATMENT PLAN (OUTPATIENT)
Dept: BEHAVIORAL HEALTH | Facility: CLINIC | Age: 60
End: 2020-12-21
Attending: FAMILY MEDICINE

## 2020-12-21 ENCOUNTER — HOSPITAL ENCOUNTER (OUTPATIENT)
Dept: BEHAVIORAL HEALTH | Facility: CLINIC | Age: 60
Discharge: HOME | End: 2020-12-21
Attending: FAMILY MEDICINE | Admitting: FAMILY MEDICINE
Payer: COMMERCIAL

## 2020-12-21 DIAGNOSIS — Z72.6 GAMBLING PROBLEM: ICD-10-CM

## 2020-12-21 PROCEDURE — 90791 PSYCH DIAGNOSTIC EVALUATION: CPT | Mod: TEL | Performed by: COUNSELOR

## 2020-12-21 ASSESSMENT — ANXIETY QUESTIONNAIRES
7. FEELING AFRAID AS IF SOMETHING AWFUL MIGHT HAPPEN: NOT AT ALL
5. BEING SO RESTLESS THAT IT IS HARD TO SIT STILL: NOT AT ALL
6. BECOMING EASILY ANNOYED OR IRRITABLE: NOT AT ALL
GAD7 TOTAL SCORE: 0
1. FEELING NERVOUS, ANXIOUS, OR ON EDGE: NOT AT ALL
IF YOU CHECKED OFF ANY PROBLEMS ON THIS QUESTIONNAIRE, HOW DIFFICULT HAVE THESE PROBLEMS MADE IT FOR YOU TO DO YOUR WORK, TAKE CARE OF THINGS AT HOME, OR GET ALONG WITH OTHER PEOPLE: NOT DIFFICULT AT ALL
4. TROUBLE RELAXING: NOT AT ALL
2. NOT BEING ABLE TO STOP OR CONTROL WORRYING: NOT AT ALL
3. WORRYING TOO MUCH ABOUT DIFFERENT THINGS: NOT AT ALL

## 2020-12-21 ASSESSMENT — PATIENT HEALTH QUESTIONNAIRE - PHQ9: SUM OF ALL RESPONSES TO PHQ QUESTIONS 1-9: 1

## 2020-12-21 NOTE — PROGRESS NOTES
"Gilbert Dawson is a 60 year old male who is participating in an evaluation for problem gambling via a billable phone/video session.    The patient has been notified of the following:     \"We have found that certain health care needs can be provided without the need for a face to face visit.  This service lets us provide the care you need with a phone conversation or video conference call.     I will have full access to your [facility] medical record during this entire phone call.   I will be taking notes for your medical record.     Since this is like an office visit, we will bill your insurance company for this service.    There are potential benefits and risks of telephone visits (e.g. limits to patient confidentiality) that differ from in-person visits.?  Confidentiality still applies for telephone/video services, and nobody will record the visit.  It is important to be in a quiet, private space that is free of distractions (including cell phone or other devices) during the visit.??     If during the course of the call I believe a telephone visit is not appropriate, you will not be charged for this service\"    Consent has been obtained for this service by care team member: Yes    Phone visit start time:  10:00am CST  Phone visit end time:  12:17pm CST    Length of session: 137 mins    Counselor verified Patient with 2-point verification: Yes ;   and name.       Gambling Evaluation   Background Information     Date of Assessment:  2020   :  Rosy Matamoros MS, [facility], [facility]GC-II   Referral Source:     Patient Name:   Gilbert Dawson   YOB: 1960 Age:  60 year old Gender:  male   Current Address:   [address] [place] [id number]     Home Phone #:  [phone]   Cell Phone #:  [phone]     Relationship Status   Ethnicity  White   Client's Primary Language:  English   E-mail address  [email or web address]   Do you give permission to give " "your cell # to the group?  yes   Emergency :  Carissa Dawson, wife   Emergency Contact Phone #:  [phone]     Do you have learning disabilities or require special accommodations?    No     What prompted you to come for a gambling assessment today?     Rule 82, Court order, probation up in Feb 2021, must do an eval and follow recommendations.  Theft by ivis quiros, not completed, served 45 days, owed $18,000 restitution October 2014.  Stole $18,000 over 2014 - 2017.       Have you been diagnosed with a gambling problem?    Yes, please explain: 15 years ago, started going to Gamblers Anonymous for 2-3 months, didn't gambler for 9 years.  Now hasn't gambled for 4 years.     Have you been diagnosed with alcohol or drug related problems?    No         DIMENSION I - Acute Intoxication /Withdrawal Potential     Gambling History    Stage Age Games Played How Often Average Amt Bet Big Wins/Losses Consequences     Early   13 - 38   Black Aamir  Poker   Turned On Digital/Stolen [employer]  Few times      $200   Went to [place] on honeymoon, played poker and black aamir  with grandma and the family a few times.     Middle     [age]    Slots  Pull Tabs   1 x mo  2 x mo   $100  $50   no   No hiding.     Late     [age]             54- 56       Slots  Pull Tabs            Slots   Pull Tabs   3 x wk  2 x wk            3 x week  1 x week   $300  $50            $300  $200   Hand pays of $1200, lost $1200          Hand pays   Hiding from wife, theft by [employer] change, senior care time, community service, restitution, probation. [place] to [place] for [place], didn't polo.    Hiding from wife, felony theft by cammie quiros, 45 days in senior care.  Says no consequences with wife.     Last Bet:  January 2017, at the [email or web address], doesn't remember his last day gambling. Was changed, family intervention, says he is black listed from the [employer], but he doesn't know if that is true.  \"Huge burden off my shoulders, no longer had to hide " "it\".    Substance Use History             X = Primary Drug Used   Age of First Use Most Recent Pattern of Use and Duration   Need enough information to show pattern (both frequency and amounts) and to show tolerance for each chemical that has a diagnosis   Date of last use and time, if needed   Withdrawal Potential? Requiring special care Method of use  (oral, smoked, snort, IV, etc)      Alcohol     13    1 x week, 2 x week in the summer, watching the game, 1 to five beers, maybe a mixed drink.  19 no oral      Marijuana/  Hashish   19  5 times in his whole life Age 43 no smoke      Cocaine/Crack     N/A           Meth/  Amphetamines   N/A           Heroin     N/A           Other Opiates/  Synthetics   N/A           Inhalants     N/A           Benzodiazepines     N/A           Hallucinogens     N/A           Barbiturates/  Sedatives/  Hypnotics N/A           Over-the-Counter Drugs   N/A           Other     N/A           Nicotine     28  Quit 2019, the day he had his heart attack.        Any current physical discomfort or withdrawal concerns?  No    Have you ever been to detox? No    How many times? NA    Have you had any of the following chemical dependency withdrawal symptoms?  Past 12 months Recent (past 30 days)   None None     Have you had any of the following gambling withdrawal symptoms?  Past 12 months Recent (past 30 days)   None None     Dimension I Ratings   Acute intoxication/Withdrawal potential - The placing authority must use the criteria in Dimension I to determine a client s acute intoxication and withdrawal potential.    RISK DESCRIPTIONS - Severity ratin Client displays full functioning with good ability to tolerate and cope with withdrawal discomfort. No signs or symptoms of intoxication or withdrawal or resolving signs or symptoms.    REASONS SEVERITY WAS ASSIGNED (What about the amount of the person s use and date of most recent use and history of withdrawal problems suggests " the potential of withdrawal symptoms requiring professional assistance? )     Patient does not appear to be under the influence or having withdrawal symptoms at the time of evaluation.        DIMENSION II - Biomedical Complications and Conditions     Do you have any current health/medical conditions?(Include any infectious diseases, allergies, or chronic or acute pain, history of chronic conditions)       Yes.   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  Quit smoking day of his heart attack.      List current medication(s) including over-the-counter or herbal supplements--including pain management:     8 different medications for his heart.    Do you follow current medical recommendations/take medications as prescribed?     Yes    Do you have any dental problems?    No    Are you up to date on your medical, dental and eye appointments?     Yes    Are you or have you ever been prescribed: Abilify (Aripiprazole), Requip (ropinirole) Zelapar (selegiline hydrochloride), Comtan (entacapone) Mirapex (pramipexole)?     No    Do you have a health care provider?    The patient's PCP is Dr. Bullock.  The patient's Primary Medical Clinic is [facility].  The patient's last medical appointment was 2 weeks ago.    Do you need a referral to have a follow up with a primary care physician?    No.    Has a health care provider/healer ever recommended that you reduce or quit alcohol/drug use/gambling?     No    Are you pregnant?     No    Have you had any injuries, assaults/violence towards you, accidents, health related issues, overdose(s) or hospitalizations related to your use of alcohol or other drugs:     No    Are you on a special diet?    No    Do you have any concerns regarding your nutritional status?    No    Have you had any appetite changes in the last 3 months?    No    Have you had weight loss or weight gain of more than 10 lbs in the last 3  "months?   If patient gained or lost more than 10 lbs, then refer to program RN / attending Physician for assessment.    No    Was the patient informed of BMI?  No    Above,  General nutrition education, is trying to lose weight right now, says he has a \"little pot belly\"    Do you have any pain control problems?     No    How is your pain managed?     NA    Do you have any concerns/problems with short or long-term memory?     No    Have you ever neglected your health because of your gambling/alcohol/drug use?     No    Have you ever been admitted to the Emergency Room as a result of your gambling/alcohol/drug use?     No    Dimension II Ratings   Biomedical Conditions and Complications - The placing authority must use the criteria in Dimension II to determine a client s biomedical conditions and complications.   RISK DESCRIPTIONS - Severity ratin Client tolerates and loreto with physical discomfort and is able to get the services that the client needs.    REASONS SEVERITY WAS ASSIGNED (What physical/medical problems does this person have that would inhibit his or her ability to participate in treatment? What issues does he or she have that require assistance to address?)    Patient reports health conditions, patient had a heart attack and a triple bypass, reporting treatment from a primary care provider.  Patient reports biomedical conditions will not impact their ability to benefit form treatment. Patient reports their ability to navigate the health care system independently.           DIMENSION III - Emotional, Behavioral, Cognitive Conditions and Complications     The patient grew up in:     Patient born in [place], to  parents, youngest of five children, all girls and him.       My childhood could be best described as:     Per patient \"good, very good, dad was a chiropractor, mom stayed home\"       Who raised you? (parents, grandparents, adoptive parents, step-parents, etc.)    Both Parents   "   Growing up, the patient was supported by:     Both parents, grandparents, coaches, lots of family     Siblings:     Thalia, 75, good relationship with all the sisters, Louise, 70, Brigid, 65, ashia 63.     Family CD/Gambling/Mental Health history:     Dads and moms side: nothing that he knows of.       Have you ever been emotionally or verbally abused?            No    Have you ever emotionally or verbally abused someone else?        No    Have you ever been physically abused?            No    Have you ever intentionally hurt yourself by hitting, cutting or burning yourself?            No    Have you ever physically abused someone else?            No    Do you have any thoughts of harming anyone?            No    Have you ever been sexually abused?            No    Have you ever sexually abused someone else?            No    Has anyone ever complained about your sexual behavior?            No    Have you ever visited pornographic sites on the internet?            No    Have you ever used food in a way that was harmful to you?            No    Have you ever starved yourself?            No    Have you ever tried to control your weight?            No    Have you ever induced vomiting after eating?            No    Have you ever been diagnosed with a clinical mental health disorder?            No    Have you ever been prescribed any medications for your mental health?            No    What medications are you currently taking?            None    Are you currently seeing a mental health therapist?            No    Have you ever had a suicide attempt?      No    Have you ever had any psychiatric hospitalizations?            No    Have you ever been diagnosed with any learning disabilities?            No    Have you ever been in the ?    No    Highest grade of school completed:     Some college, but no degree    Describe your preferred learning style:      by reading and by hands-on practice    Are you currently  "in school?            No    What are your greatest personal strengths?            Per patient \"high mechanical aptitude, good personality and disposition\".    What do you value most in life?            Per patient \"family\".    GAIN Short Screener     1.)  When was the last time that you had significant problems...  A. with feeling very trapped, lonely, sad, blue, depressed or hopeless  about the future? Never    B. with sleep trouble, such as bad dreams, sleeping restlessly, or falling  asleep during the day? Never    C. with feeling very anxious, nervous, tense, scared, panicked, or like  something bad was going to happen? Never    D. with becoming very distressed and upset when something reminded  you of the past? Never    E. with thinking about ending your life or committing suicide? Never    2.)  When was the last time that you did the following things two or more times?  A. Lied or conned to get things you wanted or to avoid having to do  something? 1+ years ago    B. Had a hard time paying attention at school, work, or home? Never    C. Had a hard time listening to instructions at school, work, or home? Never    D. Were a bully or threatened other people? Never    E. Started physical fights with other people? Never    Note: These questions are from the Global Appraisal of Individual Needs--Short Screener. Any item marked  past month  or  2 to 12 months ago  will be scored with a severity rating of at least 2.     For each item that has occurred in the past month or past year ask follow up questions to determine how often the person has felt this way or has the behavior occurred? How recently? How has it affected their daily living? And, whether they were using or in withdrawal at the time?    If the person has answered item 1E with  in the past year  or  the past month , ask about frequency and history of suicide in the family or someone close and whether they were under the influence.     NA    Has anyone " close to you, a family member, a friend or a significant other attempted or completed a suicide?     No    If the person answered item 1E  in the past month  ask about intent, plan, means and access and any other follow-up information to determine imminent risk. Document any actions taken to intervene on any identified imminent risk.      NA    Dimension III Ratings   Emotional/Behavioral/Cognitive - The placing authority must use the criteria in Dimension III to determine a client s emotional, behavioral, and cognitive conditions and complications.   RISK DESCRIPTIONS - Severity ratin Client has good impulse control and coping skills and presents no risk of harm to self or others. Client functions in    REASONS SEVERITY WAS ASSIGNED - What current issues might with thinking, feelings or behavior pose barriers to participation in a treatment program? What coping skills or other assets does the person have to offset those issues? Are these problems that can be initially accommodated by a treatment provider? If not, what specialized skills or attributes must a provider have?    Patient denies any previous treatments for problem gambling. Patient did have a Rule 82 with Marlen Burnette of [facility] and was recommended to attend outpatient and he did not attend.  Patient denies a formal mental health diagnosis at this time and denies any mental health interventions in the past.  Patient denies any current prescription for psychotropic medications at the time of assessment.  Patient's PHQ-9 was 1 out of 27, indicating no depression as the positive was related to his heart issues.  Patient's SYDNI-7 score was 0 out of 21, indicating no anxiety.  Patient denied any suicide attempts in the past.  Patient denied a history of trauma and/or abuse.  Patient appears to lack impulse control and coping skills at this time as he has been arrested on many occasions for theft by [employer]ula due to his gambling.         DIMENSION IV -  "Readiness for Change     How has your gambling affected relationships in your life?     Per patient \"made it intense during that period of time, but now it is much better, the family was very supportive\".    How has your gambling affected your finances?     Per patient \"fine now, bad then\".    What values has gambling affected in your life?     Per patient \"honesty and integrity\".    Has anyone expressed concern about your gambling?     Yes, please explain: wife, family    What changes are you willing to make relative to your gambling?     Patient reports he has not gambled since he was charged with theft by [employer].    How would you describe your current motivation to stop gambling?     Patient not gambling      Dimension IV Ratings   Readiness for Change - The placing authority must use the criteria in Dimension IV to determine a client s readiness for change.   RISK DESCRIPTIONS - Severity ratin Client displays verbal compliance, but lacks consistent behaviors; has low motivation for change; and is passively involved in treatment.    REASONS SEVERITY WAS ASSIGNED - (What information did the person provide that supports your assessment of his or her readiness to change? How aware is the person of problems caused by continued use? How willing is she or he to make changes? What does the person feel would be helpful? What has the person been able to do without help?)      Patient reports their willingness to follow treatment recommendations, reporting external motivation at this time.  Patient is court ordered to get a Rule 82 and follow recommendations.  Patient was ordered to outpatient and did not follow through on the recommendations   Patient has expressed a desire to abstain from gambling and believes he won't polo again.  Patient reports being gambling free for four years, but was gambling free for 9 years after the last theft by [employer] charges..         DIMENSION V - Relapse, Continued Use, and " "Continued Problem Potential     What triggers or situations increase your likelihood to polo?    Per patient \"chasing my losses\".  States he never got a high off of it, never enjoyed it.    How often do you use more alcohol and drugs than you planned?    never    How often do you polo with more money than you planned?    Many times    Have you ever tried to control, cut down or quit your gambling addiction?    Yes, please explain: not really, as he states he was always trying to win back money that he lost.    Have you ever tried to control your use of alcohol/drugs?    No    What did you do to stop gambling?    Was charged with theft by [employer]ula.    What was your longest period of abstinence from gambling?    9 years.    What was your longest period of abstinence from alcohol/drugs?    NA    History of Gambling/CD treatments  Where  (Program) When  (Year) Treatment   (CD/Gamb) Completed  (Yes/No) Length of time GA or CD Free     NA                   If you had prior GA or CD treatment, What was helpful?  What was not helpful?    NA    Please identify which self-help groups you have attended and how often you attended (Gamblers Anonymous, AA, NA, etc.)?    GA for few months 15 years ago.  Attended a few GA meetings over the phone.      How would you rate your urges to polo today (0-10, 0 being no urge at all)? 0    How would you rate your average urges for the last 30 days (0-10, 0 being no urge at all)? 0    What has helped you reduce your urges to polo?    NA      Dimension V Ratings   Relapse/Continued Use/Continued problem potential - The placing authority must use the criteria in Dimension V to determine a client s relapse, continued use, and continued problem potential.   RISK DESCRIPTIONS - Severity rating: 3 Client has poor recognition and understanding of relapse and recidivism issues and displays moderately high vulnerability for further substance use or mental health problems. Client has few coping " skills and rarely applies coping skills.    REASONS SEVERITY WAS ASSIGNED - (What information did the person provide that indicates his or her understanding of relapse issues? What about the person s experience indicates how prone he or she is to relapse? What coping skills does the person have that decrease relapse potential?)      Patient displays limited understanding of addiction and relapse potential.  Patient reports attending limited Gamblers Anonymous (GA) groups in the past.  Patient denies attending any problem gambling treatment in the past.  Patient appears to lack knowledge of the addictions cycle, insight into their personal relapse process along with warning signs and triggers.  Patient appears to lack insight into the effects gambling has had on their physical, emotional and mental health.  Patient appears to lack impulse control, gambling free coping skills and long-term maintenance skills.  The patient appears to be at risk for relapsing in gambling behaviors if they do not seek treatment services at this time.            DIMENSION VI - Recovery Environment   Are you currently working or in school? Please explain.     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     How has gambling affected your work?    He reported working less when gambling.    How would you describe your current financial status?  Doing well    Are you are having problems with unpaid bills, bankruptcy, IRS problems, etc.?    No    What is your approximate present gambling debt?    NA    Describe a typical week for you i.e. work, leisure activities, socializing, etc.     Sees his mother on Sundays, golf, fish, hunts, get together with family and friends.    What percentage of time do you polo alone?  With others?     alone    Are you currently in a significant relationship?     Yes.  4B. How long?  Carissa for 31 years.      Sexual Orientation:     Heterosexual    Who do you live with?      Wife  "and dog    Do you have any children?      Yes, please explain: Susan, age 26, doing very well, engaged, , gaudencio list    How many times have you been ?    once    Describe your current support system i.e. family, friends, sponsor, therapist, etc.?      Whole family and friends    Do you have any past or present legal charges?    Yes, please explain:   Patient has many charges for \"theft by chula\" for taking money to do labor jobs and then not doing them but instead spending the money on gambling.  His history spans from  to 2017.  He is currently on probation.    Do you have any obstacles that would prevent you from participating in treatment?    No    Do you pray, meditate, do yoga, attend AA/NA/GA or other spiritual practices?    No    How does your spirituality impact your recovery?      Per patient \"it's helped\"    Please list any other problems, issues or concerns that could affect your recovery if not addressed?      NA    Dimension VI Ratings   Recovery environment - The placing authority must use the criteria in Dimension VI to determine a client s recovery environment.   RISK DESCRIPTIONS - Severity ratin Client is engaged in structured, meaningful activity and has a supportive significant other, family, and living environment.    REASONS SEVERITY WAS ASSIGNED - (What support does the person have for making changes? What structure/stability does the person have in his or her daily life that will increase the likelihood that changes can be sustained? What problems exist in the person s environment that will jeopardize getting/staying clean and sober?)     The patient reports living with his wife and dog at the time of assessment.  Patient reports living environment is supportive of recovery efforts.      The patient appears to lack adequate support in the community through 12-step meetings or other recovery based interactions at this time and appears to lack additional " "supports familiar with recovery.  Patient is self employed and will be retiring at the end of the year leaving a lot of idle time. Patient is currently on probation.         Collateral Contacts     Name:    Lissa Villavicencio   Relationship:    , [facility]   Phone Number:    [phone] Releases:    Yes     Had received verbal permission to talk to PO before the eval.  Faxed release to PO and talked to her on 12/15/20 at 1:20pm .  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  Explained how the assessment will go, but given his history of gambling and crimes to support his gambling, there is concern of relapse if he doesn't complete a treatment program.        Collateral Contacts     Name:    Carissa Dawson   Relationship:    Wife   Phone Number:    On file   Releases:    Yes     Patient's wife reported the same information patient had shared she is worried about his anger and communication and that if he doesn't learn coping skills he will have another heart attack.  She is very busy with work and would rather not add on the stress of another zoom meeting for family night.  She did not know about his gambling either time until it became so bad that he was having charges pressed against him.  She is not worried about his drinking even though one of his gambling was pull tabs at the bars.          Summary of Gambling Disorder Symptoms     Needs to polo with increasing amount of money in order to achieve desired excitement.  Is often preoccupied with gambling (e.g. having persistent thoughts of reliving past gambling experiences, handicapping or planning the next venture, thinking of ways to get money with which to polo).  After losing money gambling, individual often returned another day to get even. (\"chasing one's losses\")  Lies to conceal the " "extent of involvement with gambling.  Has jeopardized or lost a significant relationship, job, educational or career opportunity because of gambling.  Relies on others to provide money to relieve desperate financial situations caused by gambling (\"a bailout\")    Specify if:   Episodic:  Meeting diagnostic at more than one time point, with symptoms subsiding between periods of gambling disorder for at least several months.    Persistent:  Experiencing continuous symptoms, to meet diagnostic criteria for multiple years.    Specify if:   In early remission:  After full criteria for alcohol/drug use disorder were previously met, none of the criteria for alcohol/drug use disorder have been met for at least 3 months but for less than 12 months (with the exception that Criterion A4,  Craving or a strong desire or urge to use alcohol/drug  may be met).     In sustained remission:   After full criteria for alcohol use disorder were previously met, none of the criteria for alcohol/drug use disorder have been met at any time during a period of 12 months or longer (with the exception that Criterion A4,  Craving or strong desire or urge to use alcohol/drug  may be met).   Specify if:   This additional specifier is used if the individual is in an environment where access to alcohol is restricted.    Mild: Presence of 4-5 symptoms    Moderate: Presence of 6-7 symptoms    Severe: Presence of 8 or more symptoms      Summary of Substance Abuse Disorder Symptoms     A problematic pattern of alcohol/drug use leading to clinically significant impairment or distress, as manifested by at least two of the following, occurring within a 12-month period:    NA    Specify if:   In early remission:  After full criteria for alcohol/drug use disorder were previously met, none of the criteria for alcohol/drug use disorder have been met for at least 3 months but for less than 12 months (with the exception that Criterion A4,  Craving or a strong " desire or urge to use alcohol/drug  may be met).     In sustained remission:   After full criteria for alcohol use disorder were previously met, none of the criteria for alcohol/drug use disorder have been met at any time during a period of 12 months or longer (with the exception that Criterion A4,  Craving or strong desire or urge to use alcohol/drug  may be met).   Specify if:   This additional specifier is used if the individual is in an environment where access to alcohol is restricted.    Mild: Presence of 2-3 symptoms    Moderate: Presence of 4-5 symptoms    Severe: Presence of 6 or more symptoms    SOGS: 9 DSM-5: 0 CAGE-AID: 0 SYDNI-7: 0 PHQ-9: 1     Mental Status Assessment    Physical Appearance/Attire:  Comment: telephone visit  Hygiene:  Comment: telephone visit  Eye Contact:  comment: telephone visit  Speech:  regular  Speech Volume:  regular  Speech Quality: fluid  Cognitive/Perceptual:  reality based  Cognition:  memory intact   Judgment:  intact  Insight:  intact  Orientation:  time, place, person and situation  Thought:  logical   Hallucinations:  none  General Behavioral Tone:  cooperative  Psychomotor Activity:  no problem noted  Gait:  Telephone visit  Mood:  normal  Affect:  congruence/appropriate      Vulnerable Adult Checklist for OUTPATIENTS     1.  Do you have a physical, emotional or mental infirmity or dysfunction?       No    2.  Does this issue impair your ability to provide for your own care without help, including providing yourself with food, shelter, clothing, healthcare or supervision?       No    3.  Because of this issue, I need assistance to protect myself from maltreatment by others.      No    Based on the above information:    This person is not a functional Vulnerable Adult according to [place] Statute 626.5572 subdivision 21.      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     Lifetime:  No Past Month:  No   2.) Have you actually had any thoughts of killing yourself?   Lifetime:  No Past Month:  No   3.) Have you been thinking about how you might do this?     Lifetime:  No Past Month:  No   4.) Have you had these thoughts and had some intention of acting on them?     Lifetime:  No Past Month:  No   5.) Have you started to work out the details of how to kill yourself?   Lifetime:  No Past Month:  No   6.) Do you intend to carry out this plan?      Lifetime:  NA Past Month:  NA   Intensity of Ideation   Intensity of ideation (1 being least severe, 5 being most severe):     Lifetime:  NA Past Month:  NA   How often do you have these thoughts?  N/A      When you have the thoughts how long do they last?  N/A   Can you stop thinking about killing yourself or wanting to die if you want to?  N/A   Are there things - anyone or anything (i.e. family, [employer], pain of death) that stopped you from wanting to die or acting on thoughts of suicide?  Does not apply   What sort of reasons did you have for thinking about wanting to die or killing yourself (ie end pain, stop how you were feeling, get attention or reaction, revenge)?  Does not apply   Suicidal Behavior   (Suicide Attempt) - Have you made a suicide attempt?     Lifetime:  NA Past Month:  NA   Have you engaged in self-harm (non-suicidal self-injury)?  NA   (Interrupted Attempt) - Has there been a time when you started to do something to end your life but someone or something stopped you before you actually did anything?  NA    (Aborted or Self-Interrupted Attempt) - Has there been a time when you started to do something to try to end your life but you stopped yourself before you actually did anything?  NA   (Preparatory Acts of Behavior) - Have you taken any steps towards making suicide attempt or preparing to kill yourself (such as collecting pills, getting a gun, giving valuables away or writing a suicide note)?  NA   Actual Lethality/Medical Damage:  NA     2008  [facility]  Used with permission by Melanie Lundy, PhD.       Guide to C-SSRS Risk Ratings   NO IDEATION:  with no active thoughts IDEATION: with a wish to die. IDEATION: with active thoughts. Risk Ratings   If Yes No No 0 - Very Low Risk   If NA Yes No 1 - Low Risk   If NA Yes Yes 2 - Low/moderate risk   IDEATION: associated thoughts of methods without intent or plan INTENT: Intent to follow through on suicide PLAN: Plan to follow through on suicide Risk Ratings cont...   If Yes No No 3 - Moderate Risk   If Yes Yes No 4 - High Risk   If Yes Yes Yes 5 - High Risk   The patient's ADDITIONAL RISK FACTORS and lack of PROTECTIVE FACTORS may increase their overall suicide risk ratings.     Additional Risk Factors:    No additional risk factors   Protective Factors:    Having people in his/her life that would prevent the patient from considering a suicide attempt (i.e. young children, spouse, parents, etc.)     Having pet(s) that give companionship and/or would prevent the patient from considering a suicide attempt     An absence of mental health issues or stable and well treated mental health issues     An absence of chronic health problems or stable and well treated chronic health issues     A positive relationship with his/her clinical medical and/or mental health providers     Having easy access to supportive family members     Having a good community support network     Having cultural, Shinto or spiritual beliefs that discourage  "suicide     Having restricted access to highly lethal means of suicide     Risk Status   0. - Very Low Risk:  Evaluation Counselors:  Document in Epic / SBAR to counselor \"Very Low Risk\".      Treatment Counselors:  Reassess upon admission as applicable, assess weekly in progress notes under Dimension 3 and summarize in Discharge / Treatment summary under Dimension 3.   Additional information to support suicide risk rating: There was no additional information to provide at this time.     Summary of ASA Placement Criteria:   I.) Intoxication and Withdrawal: 0   II.) Biomedical:  1   III.) Emotional and Behavioral:  0   IV.) Readiness to Change:  3   V.) Relapse Potential: 3   VI.) Recovery Environmental: 0     Evaluation Summary and Plan   's Recommendation    Abstain from all forms of gambling, including \"free\" games , and online/angel games.  Refrain from entering all types of ike establishments.  Self-ban with the help of a trusted other from all local casinos/card rooms, cut up players cards and have all gambling mail stopped.  Abstain from all mood-altering chemicals unless prescribed by a licensed provider.  Complete the evening outpatient compulsive gambling treatment program at [facility].   Complete Orientation and begin group on 01/05/2021.  Attend [place] 12-step, cultural, spiritual, other supportive community meeting on a weekly basis.   Have someone you check in with weekly who will hold you accountable.   Remain law abiding and follow all recommendations of the courts/PO.  Limit, if not abstain from alcohol and all other non-prescribed mood altering chemicals    At the beginning of the assessment patient was offered information on inpatient treatment, other outpatient options, individual counselors, and Gamblers Anonymous.  Patient chose to attend [facility] as it worked for their schedule and has a family component.   has a financial interest in patient attending the " [facility] program.  's clinical opinion is that the patient would benefit from group therapy.      Initial problem list:    The patient lacks relapse prevention skills  The patient has poor coping skills  The patient has poor refusal skills   The patient lacks a sober peer support network  The patient has current legal issues    Strengths:  Family support  Stable Employment  Financially secure  Properly treated physical health concerns  Stable housing  Functional communication skills

## 2020-12-22 ASSESSMENT — ANXIETY QUESTIONNAIRES: GAD7 TOTAL SCORE: 0

## 2020-12-28 PROBLEM — Z72.6 GAMBLING PROBLEM: Status: ACTIVE | Noted: 2020-12-28

## 2021-01-12 ENCOUNTER — HOSPITAL ENCOUNTER (OUTPATIENT)
Dept: BEHAVIORAL HEALTH | Facility: CLINIC | Age: 61
End: 2021-01-12
Attending: FAMILY MEDICINE
Payer: COMMERCIAL

## 2021-01-12 PROCEDURE — 90853 GROUP PSYCHOTHERAPY: CPT | Mod: GT | Performed by: COUNSELOR

## 2021-01-12 PROCEDURE — 90832 PSYTX W PT 30 MINUTES: CPT | Mod: 95 | Performed by: COUNSELOR

## 2021-01-13 NOTE — PROGRESS NOTES
Group Therapy Documentation     Was the patient seen in-person or via Telemedicine (if in-person skip to Group Note): Telemedicine    If Telemedicine: The patient's condition can be safely assessed and treated via synchronous audio and visual telemedicine encounter. ? ?      Reason for Telemedicine Visit:? Due to Covid, only offering telehealth at this time.    Originating Site (Patient Location):  Home    Distant Site (Provider Location): Counselor off site    Consent: ?The patient/guardian has verbally consented to: the potential risks and benefits of telemedicine (video visit) versus in person care; bill my insurance or make self-payment for services provided; and responsibility for payment of non-covered services.       Mode of Communication:??Video Conference via Zoom      As the provider I attest to compliance with applicable laws and regulations related to telemedicine.        Patient attended a video group session on the following days: ?      5:00pm - 5:30pm: Patient attended Problem Gambling OP orientation.  Patient had received their assignment notebook in the mail, folder was gone through and discussed, patient agreed verbally to the Admission Orientation Checklist, due to Covid, not signed in person but gave verbal agreement.        GROUP NOTE:    DATE OF SERVICE:  January 12, 2021    START TIME:  5:30pm    END TIME:  7:08pm    FACILITATOR(S):    Rosy Matamoros MS, LADC, ICGC-II    TOPIC: BEH Group Therapy, Problem Gambling Group     Number of patients attending the group: 9    Group Length:   1 hour, 38 minutes    Group Therapy Type:  Problem Gambling Group    Group Attendance:  Client attended group     Summary of Group / Topics Discussed: Patients checked in and shared how they were doing in group.  GA speaker did not show up for the zoom meeting as scheduled.  Discussion was held on the need for connection, appreciation, and an altitude of gratitude while in early recovery, and while living in a  pandemic.  Patients were asked to share stories of how they have showed appreciation for others over the past few days and how they could do something tomorrow to show appreciation to someone.      Patient's response to the group topic/interactions:  Patient was involved in the discussion and added to the discussion.       Client specific details:   Patient reported that they would surprise his mother by taking her to lunch  tomorrow as a way to show appreciation and build an attitude of gratitude in recovery.

## 2021-01-19 ENCOUNTER — HOSPITAL ENCOUNTER (OUTPATIENT)
Dept: BEHAVIORAL HEALTH | Facility: CLINIC | Age: 61
End: 2021-01-19
Attending: FAMILY MEDICINE
Payer: COMMERCIAL

## 2021-01-19 PROCEDURE — 90853 GROUP PSYCHOTHERAPY: CPT | Mod: GT | Performed by: COUNSELOR

## 2021-01-20 ENCOUNTER — TELEPHONE (OUTPATIENT)
Dept: BEHAVIORAL HEALTH | Facility: CLINIC | Age: 61
End: 2021-01-20

## 2021-01-20 NOTE — PROGRESS NOTES
Group Therapy Documentation     Was the patient seen in-person or via Telemedicine (if in-person skip to Group Note): Telemedicine    If Telemedicine: The patient's condition can be safely assessed and treated via synchronous audio and visual telemedicine encounter. ? ?      Reason for Telemedicine Visit:? Due to Covid, only offering telehealth at this time.    Originating Site (Patient Location):  Home    Distant Site (Provider Location): Counselor off site    Consent: ?The patient/guardian has verbally consented to: the potential risks and benefits of telemedicine (video visit) versus in person care; bill my insurance or make self-payment for services provided; and responsibility for payment of non-covered services.       Mode of Communication:??Video Conference via Zoom      As the provider I attest to compliance with applicable laws and regulations related to telemedicine.        Patient attended a video group session on the following days: ?          GROUP NOTE:  DATE OF SERVICE:  January 19, 2021    START TIME:  5:30pm    END TIME:  7:05pm    FACILITATOR(S):    Rosy Matamoros MS, CHARLETTE, ICGC-II    TOPIC: BEH Group Therapy, Problem Gambling Group     Number of patients attending the group: 7    Group Length:   95mins    Group Therapy Type:  Problem Gambling Group    Group Attendance:  Client attended group     Summary of Group / Topics Discussed: [employer] speaker presented her story of addiction and recovery.     Patient's response to the group topic/interactions:  Patient's checked in and shared how they were doing currently.  Patient's were reminded to invite family to family group.      Client specific details:   Patient's listened as speaker shared her story of addiction and recovery with the help of [employer].  None of the patient's asked questions of the speaker.  None of the patients had assignments ready to present.  Patient signed up to complete his treatment planning.

## 2021-01-20 NOTE — PROGRESS NOTES
[facility]  Adult Gambling Program  Treatment Plan Requirements    These services are provided by the facility for each patient/client according to the individual's treatment plan:    Individual and group counseling    Education    Transition services    Services to address any co-occurring mental illness    Service coordination    Initial Treatment Plan Goals if noted in plan:  1. Complete all the requirements of Program Orientation.  2. Maintain medication compliance throughout the program.  3. Complete gambling therapy for identified issues on your problem list.  4. Gain family involvement in treatment process to address family issues from the problem list.  5. Attend and participate in all required group(s) per individual treatment plan.  6. Focus attention to individualized issues from the treatment plan.  7. Schedule a physical examination if recommended.    In addition to the above, complete all individual goals as specifically outlines on your treatment plan.    Criteria for discharge:  Patients/clients are discharged from the program following completion of the entire program including Phase I and II or acceptance of other post-treatment referrals to mental health providers, or aftercare at other facilities.  Patients/clients may also be discharged for inappropriate behavior, chemical use or gambling.      Favorable Discharge - Patients/clients have completed agreed upon treatment goals, understand their diagnosis and appear motivated about the follow-up care.    Guarded Discharge - Patients/clients have demonstrated some understanding of their diagnosis and recovery process, and have completed some of their treatment goals.  This prognosis also includes patients/clients who have completed some treatment goals but have not made commitment to community support or follow through with referrals.    Unfavorable Discharge - Patients/clients have not completed agreed upon treatment  goals due to their own choice, have limited understanding of their diagnosis, and have shown minimal or inconsistent behavior conducive to recovery.  Those patients/clients discharged due to behavioral problems will also be unfavorable discharges.                                  Adult Gambling Treatment Plan     Gilbert Dawson       [id number]  1960       60 year old       male    Acute Intoxication/Withdrawal Potential      DIMENSION 1  RISK FACTOR: 0       Date  Source  Prob/Goal/  Intervention  Target  Date  Initials  Outcome  Completion  Date    January 20, 2021     Self - Current,   and Assessment - Current   Problem:   -Patient did not appear to be under the influence or in withdrawal on the date of assessment.  Goal:   -Limit, if not abstain, from alcohol, and abstain from all other mood altering chemicals due to history of addiction and/or concern of cross addiction.    -Remain gambling free.  Intervention:   -Report to counselor and group any concern or abuse with alcohol, drug use, or gambling during treatment.   Handout:    -Rethinking Drinking  Group:     -Share with counseling staff any gambling or drinking                               orientation    In group SC /  JNM                                                             EFF                                     1/12/2021 02/11/21     Biomedical Conditions and Complaints      DIMENSION 2  RISK FACTOR:   1       Date Source  Prob/Goal/  Intervention  Target  Date  Initials  Outcome  Completion  Date   January 20, 2021 Self - Current, and Assessment - Current  Problem:   -Patient reports a history of triple bypass surgery.  PT denies any issues related to chronic pain.  Goal:  -Follow recommendations of medical provider.  Intervention:  -Keep and attend scheduled medical appointments and follow recommendations.  -Take medications as prescribed                 ongoing    As prescribed  SC /  JNM                               Eff      Eff  "                02/11/21 02/11/21 January 20, 2021 Self - Current, and Assessment - Current   Problem:   -Poor exercise, diet, and sleep habits.   Goal:   -Improve physical and psychological health.   Interventions:   -Start an exercise program with regards to your ability, eat 3 nutritional meals daily and get 6-8 hours of sleep nightly.           Daily    SC /  JNM     '    Not complete  By d/c            Emotional/Behavioral/Cognitive Conditions and Complications     DIMENSION 3  RISK FACTOR: 0            Date Source Prob/Goal/  Intervention Target  Date Initials Outcome Completion  Date   January 20, 2021 Self - Current and Assessment - Current   Problem:    -Patient lacks utilizing cultural and/or spiritual community supports to engage in recovery.  Goal:   -Integrate patients personal beliefs and traditions to serve as a framework for healing in treatment.  Intervention:  -Patient to search out and join a culturally specific group, team, spiritual center, or social event that is held at least monthly to increase support for long term recovery.                   ongoing SC /  JNM                                 Not complete  By d/c    January 20, 2021 Self - Current, and Assessment - Current   Problem:   -Patient lacks impulse control and understanding of the effects of compulsive gambling on emotions.  Goal:   -To understand the effects of compulsive gambling on your emotions.  Intervention:  -Utilize emotions list, and begin journaling daily and share progress with counseling staff.  -Complete homework assignment \"Self-Sabotage\" and share in group.                 Ongoing      In group SC /  JNM                                         Not complete  By d/c    January 20, 2021 Self - Current Assessment - Current   Problem:   -Patient lacks an understanding of gambling as a disease.  Goal:    -Gain awareness of gambling as a chronic, progressive, incurable (but treatable) " "disease.  Intervention:  -NAC/Vivitrol/Naltrexone Discussion:  Handout:    -Gambling and the Brain  -Can't stop, won't stop: Feeling impulsive, compulsive, or addicted  Group Movie:    -Disease of compulsive gambling                 Orientation    In group  In group      In group SC /  JNM                                       Not complete  By d/c                 01/12/21 January 20, 2021 Self - Current and Assessment - Current   Problem:   -Patient reports feelings of guilt and shame.  Goal:    -To understand characteristics of shame.   Intervention:  Group videos:    -Christopher Grimes:  Healing the Shame that Binds Us,  -Gilberto Chua: Listening to Shame  Handout:   -Difference between guilt and shame.  -Guilt and Shame homework assignment               In group    In group    In group  In group SC /  JNM                           Not complete  By d/c    January 20, 2021 Self - Current, and Assessment - Current   Problem:   -Patient struggles to have open and clear communication with others.  Goal:    -Increase understanding of clear communication styles.  Intervention:  Handout:    -Communication Styles                 In group SC /  JNM                               EFF                 2/3/2021   January 20, 2021 Self - Current, and Assessment - Current   Problem:   -Patient struggles with suicidal ideation, past suicide attempts, or is diagnosed with an addiction that has a high rate of suicidal ideation.  Goal:    -Increase awareness of signs of growing distress and interventions to decrease suicidal ideation  Intervention:  -Complete \"Patient Safety Plan   -Update  Patient Safety Plan                      Intake   As needed SC /  JNM                                         eff                     02/11/21 January 20, 2021   Problem:   -Patient struggles with self-esteem, self worth and acceptance.  Goal:   -Increase acceptance of personality not as a deficit, but that it is who they are and can be accepted as " an asset.  -Begin identifying and implementing positivity.  Intervention:  Handout:   -How to improve you self esteem and self esteem inventory & positive traits.  Group Work:    -Enneagram Personality Inventory                               In group   SC /  RAKESH                                             EFF                               2/2/2021     Readiness to Change     DIMENSION 4  RISK FACTOR:   2            Date Source Prob/Goal/  Intervention Target  Date Initials Outcome Completion  Date   January 20, 2021 Self - Current, Assessment - Current   Problem:   -Patient diagnosed with Disordered Gambling (DSM-V) and would benefit from attending an outpatient problem Gambling group.  Goal:    -To refrain from gambling, increase knowledge of problem gambling, and to gain skills to help remain gambling free.  Intervention:  -24 Sessions of Phase I group (T/W/TH: 5:30 - 7:30pm)  -20 Sessions of Phase II group (T/TH: 5:30 - 7:30pm)  -Additional counseling sessions as deemed beneficial  *Phase III, peer led group (Mondays: 5:30 - 7:30pm)                     Phase I    Phase II    Ongoing    Phase III SC /  FEDEM                                       Eff                     Not complete  By d/c   January 20, 2021 Self - Current, and Assessment - Current   Problem:   -Problem: Lack of understanding the illness of compulsive gambling and life areas impacted.   Goal:   -To understand the illness of compulsive gambling and to examine all areas affected.   Intervention:  Group:    -What brought you to group and your progression                     In group SC /  FEDEM                                     EFF                       1/12/2021 January 20, 2021 Self - Current, and Assessment - Current   Problem:   -Patient has a combination of internal and external motivation preceding treatment admission.                       Goal:  -Increase internal motivation to remain sober and be able to utilize external  resources.  Intervention:  Handout:   -Setting and Pursuing Goals in Recovery                        In group SC /  [facility]                                         EFFgrp                     2/11/2021       Relapse/Continued Use/Continued Problem Potential     DIMENSION 5  RISK FACTOR:   3               Date Source Prob/Goal/  Intervention Target  Date Initials Outcome Completion  Date   January 20, 2021 Self - Current, and Assessment - Current   Problem:    -Patient has poor relationship with money, large amounts of money gambled, and financial stressors because of gambling.  Goal:   -Remove all access to money and involvement in financial matters, protect assets.  reduce financial stress and gain insight into money relationship   Intervention:  -Set up financial safeguards, such as a trusted person, bank, and/or other financial institution.  Handout:   -Chapter 16:  Emotional Meaning of Money                       Ongoing        In group SC /  [facility]                                               EFF                             1/26/2021 January 20, 2021 Self - Current, and Assessment - Current   Problem:    -Patient lacks an understanding of relapse triggers and cues.  Goal:    -Gain an understanding of events, places, and other variables can trigger a relapse.  Intervention:  Handout:  -Trigger Inventory                 In group SC /  [facility]                               EFFgrp                 2/11/2021         Recovery Environment     DIMENSION 6  RISK FACTOR:    0                Date Source Prob/Goal/  Intervention Target  Date Initials Outcome Completion  Date     January 20, 2021 Self - Current, and Assessment - Current   Problem:   -Patient lacks a strong sober support system.  Goal:   -Develop a strong network of people in recovery  -Introduction to Gamblers Anonymous, & learn alternatives to GA.  Intervention:  Speaker:    -GA Member  Group movie:    -The Anonymous People                   In group    In group      SC /  JNM                                     EFF  Not complete  By d/c                         01/19/21 &  2/9/2021 January 20, 2021 Self - Current, and Assessment - Current   Problem:   -Relationships with family/concerned persons have been strained through use and related behaviors  Goal:    -Begin healing damaged relationships.  Allow adequate time for healing to occur  Intervention:  Handout:    -The 5 Love Languages followed by discussion.                     In group SC /  JNM                                       Eff                     02/03/21 January 20, 2021 Self - Current, and Assessment - Current   Problem:    -Patient reports current legal involvement.  Goal:    -Understand the correlation between your use and your legal system involvement.    -Remain law abiding and follow probation/court guidelines and begin to resolve legal issues.  Intervention:   -Avoid further negative involvement with the criminal justice system.  -Stay in touch with probation office and remain compliant with the terms.                   Ongoing    Ongoing   SC /  JNM                                   Eff    eff                   02/11/21 02/11/21 January 20, 2021 Self - Current, and Assessment - Current   Problem:   -Patient has suffered the effects of problem gambling  Goal:   -Complete Treatment with a plan for long term recovery.  Intervention:   Handout:    -Planning Aftercare  -Continuing Care Plan  for Long Term Recovery  -Begin to explore and identify structure, goals and routines for planned [facility].                 In group  In group    Ongoing SC /  JNM                                 Not complete  By d/c        All interventions that are designated as 'current' will need to be completed in order to transition out of treatment with a favorable prognosis. The treatment plan is a flexible document and a work in progress. Interventions and goals may be added at any time to customize plan to  each individual's needs. Client may work with therapist to change interventions as long as they pertain to the goals stipulated in the plan and/or are clinically driven.

## 2021-01-26 ENCOUNTER — HOSPITAL ENCOUNTER (OUTPATIENT)
Dept: BEHAVIORAL HEALTH | Facility: CLINIC | Age: 61
End: 2021-01-26
Attending: FAMILY MEDICINE
Payer: COMMERCIAL

## 2021-01-26 PROCEDURE — 90832 PSYTX W PT 30 MINUTES: CPT | Mod: 95 | Performed by: COUNSELOR

## 2021-01-26 PROCEDURE — 90853 GROUP PSYCHOTHERAPY: CPT | Mod: GT | Performed by: COUNSELOR

## 2021-01-27 ENCOUNTER — TELEPHONE (OUTPATIENT)
Dept: BEHAVIORAL HEALTH | Facility: CLINIC | Age: 61
End: 2021-01-27

## 2021-01-27 NOTE — PROGRESS NOTES
"Group Therapy Documentation     Was the patient seen in-person or via Telemedicine (if in-person skip to Group Note): Telemedicine    If Telemedicine: The patient's condition can be safely assessed and treated via synchronous audio and visual telemedicine encounter. ? ?      Reason for Telemedicine Visit:? Due to Covid, only offering telehealth at this time.    Originating Site (Patient Location):  Home    Distant Site (Provider Location): Counselor off site    Consent: ?The patient/guardian has verbally consented to: the potential risks and benefits of telemedicine (video visit) versus in person care; bill my insurance or make self-payment for services provided; and responsibility for payment of non-covered services.       Mode of Communication:??Video Conference via Zoom      As the provider I attest to compliance with applicable laws and regulations related to telemedicine.        Patient attended a video group session on the following days: ?          GROUP NOTE:  DATE OF SERVICE:  January 26, 2021    START TIME:  5:30pm    END TIME:  7:15pm    FACILITATOR(S):    Rosy Matamoros MS, [facility], [facility]    TOPIC: BEH Group Therapy, Problem Gambling Group     Number of patients attending the group: 7    Group Length:   105 mins    Group Therapy Type:  Problem Gambling Group    Group Attendance:  Client attended group     Summary of Group / Topics Discussed: Patient's checked in and welcomed a new group member.  Patient's worked on the \"Emotional Meaning of Money\" Assignment together. Group was sent the assignment after group to complete and to do \"Money Mechanics\" and \"What Color is Your Money\".     Patient's response to the group topic/interactions:  Patient's shared stories about their feelings and connections to money and how they would get money if they wanted to polo. Patient will complete the assignment and present it to the group at a later date.      Client specific details:   Patient shared stories about his " first job and saving for his first car.

## 2021-01-27 NOTE — PROGRESS NOTES
Video-Visit Details    Type of service:  Video Visit    Video Start Time (time video started): 5pm    Video End Time (time video stopped): 5:30pm    Originating Location (pt. Location): Home    Distant Location (provider location):  [facility]     Mode of Communication:  Video Conference via zoom    Physician has received verbal consent for a Video Visit from the patient? Yes    Patient confirmed via name and .    D) Pt met with Problem Gambling counseling staff to go over treatment plan.   A) Pt agreed to treatment plan and appeared motivated to begin working on problems identified and discussed. Treatment goals include all Dimensions. Patient did not sign treatment plan as all visits are via telehealth due to Covid.  P) Pt to continue in group and working on assignments.  Rosy Matamoros MS, LADC. ICGC-II

## 2021-02-02 ENCOUNTER — HOSPITAL ENCOUNTER (OUTPATIENT)
Dept: BEHAVIORAL HEALTH | Facility: CLINIC | Age: 61
End: 2021-02-02
Attending: FAMILY MEDICINE
Payer: COMMERCIAL

## 2021-02-02 PROCEDURE — 90853 GROUP PSYCHOTHERAPY: CPT | Mod: GT | Performed by: COUNSELOR

## 2021-02-03 ENCOUNTER — HOSPITAL ENCOUNTER (OUTPATIENT)
Dept: BEHAVIORAL HEALTH | Facility: CLINIC | Age: 61
End: 2021-02-03
Attending: FAMILY MEDICINE
Payer: COMMERCIAL

## 2021-02-03 PROCEDURE — 90853 GROUP PSYCHOTHERAPY: CPT | Mod: 95

## 2021-02-03 NOTE — PROGRESS NOTES
Group Therapy Documentation     Was the patient seen in-person or via Telemedicine (if in-person skip to Group Note): Telemedicine    If Telemedicine: The patient's condition can be safely assessed and treated via synchronous audio and visual telemedicine encounter. ? ?      Reason for Telemedicine Visit:? Due to Covid, only offering telehealth at this time.    Originating Site (Patient Location):  Home    Distant Site (Provider Location): Counselor off site    Consent: ?The patient/guardian has verbally consented to: the potential risks and benefits of telemedicine (video visit) versus in person care; bill my insurance or make self-payment for services provided; and responsibility for payment of non-covered services.       Mode of Communication:??Video Conference via Zoom      As the provider I attest to compliance with applicable laws and regulations related to telemedicine.        Patient attended a video group session on the following days: ?          GROUP NOTE:  DATE OF SERVICE:  February 2, 2021    START TIME:  5:30pm    END TIME: 7:15pm    FACILITATOR(S):    Rosy Matamoros MS, CHARLETTE, ICGC-II    TOPIC: BEH Group Therapy, Problem Gambling Group     Number of patients attending the group: 9    Group Length:   105    Group Therapy Type:  Problem Gambling Group    Group Attendance:  Client attended group     Summary of Group / Topics Discussed: Group checked in and shared their highs and lows for the week and how they are doing in recovery.  Group welcomed new group member this evening.  Group had been given the task to take the Enneagram test prior to group and be prepared to discuss the outcome in group.  Many of the men scored highs in the 2 and 7 and the women in the 3 and 9.  Discussion was held on all the types of people in the world and how know who we are helps us get along with others.  No one type is better, just all see things through a different lens.      Patient's response to the group  topic/interactions:  Patient shared their number and discussion was held on how they felt it was similar or not to their personality.      Client specific details:   Patient shared that he had taken the test but didn't write down the numbers so he went and took it again.  He seem engaged in the process and asked questions.

## 2021-02-04 ENCOUNTER — HOSPITAL ENCOUNTER (OUTPATIENT)
Dept: BEHAVIORAL HEALTH | Facility: CLINIC | Age: 61
End: 2021-02-04
Attending: FAMILY MEDICINE
Payer: COMMERCIAL

## 2021-02-04 PROCEDURE — 90853 GROUP PSYCHOTHERAPY: CPT | Mod: GT | Performed by: COUNSELOR

## 2021-02-04 NOTE — PROGRESS NOTES
Group Documentation     Was the patient seen in-person or via Telemedicine (if in-person skip to Group Note): Telemedicine    If Telemedicine: The patient's condition can be safely assessed and treated via synchronous audio and visual telemedicine encounter. ? ?      Reason for Telemedicine Visit:? Group only offered via telemedicine.    Originating Site (Patient Location):  Home    Distant Site (Provider Location): [facility]      Consent: ?The patient/guardian has verbally consented to: the potential risks and benefits of telemedicine (video visit) versus in person care; bill my insurance or make self-payment for services provided; and responsibility for payment of non-covered services.       Mode of Communication:??Video Conference via Zoom      As the provider I attest to compliance with applicable laws and regulations related to telemedicine.        Patient attended a video group session on the following days: ?          GROUP NOTE:  DATE OF SERVICE:  February 3, 2021    START TIME:  5:30 PM    END TIME:  7:05 PM    FACILITATOR(S):  [place]LETTE [place], [facility]    TOPIC: BEH Group Therapy, Problem Gambling Group - Family    Number of patients attending the group: 4      In-person: 0      Video Conference:4  2 family members were also in attendance via video conference.    Group Length:   95 minutes    Group Therapy Type:  Problem Gambling Family Group    Group Attendance:  Client attended group.    Summary of Group / Topics Discussed: Group began with brief check in and introductions.  Group topics discussed were the 4 types of communication, including passive, passive-aggressive, aggressive and assertive. PT's then discussed the 5 Love Languages as defined by Yanick Solitario, including, words of affirmation, acts of services, receiving gifts, quality time, and physical touch.     Patient's response to the group topic/interactions:  Discussion was held on communication styles, healthy  vs. Unhealthy communication and rules for fair fighting.  PT participated in group discussion.      Client specific details:   PT shared he actively works towards utilizing assertive communication with others.

## 2021-02-05 NOTE — PROGRESS NOTES
Group Therapy Documentation     Was the patient seen in-person or via Telemedicine (if in-person skip to Group Note): Telemedicine    If Telemedicine: The patient's condition can be safely assessed and treated via synchronous audio and visual telemedicine encounter. ? ?      Reason for Telemedicine Visit:? Due to Covid, only offering telehealth at this time.    Originating Site (Patient Location):  Home    Distant Site (Provider Location): Counselor off site    Consent: ?The patient/guardian has verbally consented to: the potential risks and benefits of telemedicine (video visit) versus in person care; bill my insurance or make self-payment for services provided; and responsibility for payment of non-covered services.       Mode of Communication:??Video Conference via Zoom      As the provider I attest to compliance with applicable laws and regulations related to telemedicine.        Patient attended a video group session on the following days: ?          GROUP NOTE:  DATE OF SERVICE:  February 4, 2021    START TIME:  5:30pm    END TIME:  7:34pm    FACILITATOR(S):    Rosy Matamoros MS, CHARLETTE, ICGC-II    TOPIC: BEH Group Therapy, Problem Gambling Group     Number of patients attending the group: 3    Group Length:   124 mins    Group Therapy Type:  Problem Gambling Group    Group Attendance:  Client attended group     Summary of Group / Topics Discussed: Small group so were able to discuss various topics of early recovery.  Discussed finances, marijuana use, triggers and urges, how to get a GA sponsor, and many other topics that the newer members asked questions about recovery and addiction.      Patient's response to the group topic/interactions:  Patient was very engaged in the discussion, he asked questions and added his ideas into the discussion.      Client specific details:   Patient shared about his gambling and how some of his choices had impacted his life over the years.

## 2021-02-08 NOTE — ADDENDUM NOTE
Encounter addended by: Susan Christian [facility] on: 2/8/2021 2:07 PM   Actions taken: Clinical Note Signed

## 2021-02-08 NOTE — PROGRESS NOTES
Patient:  Gilbert Dawson                         Date of Assessment: 12/21/2020            Problem Gambling Progress Note and Treatment Plan Review     Attendance  Group/Individual: Phase 1 Due to Covid-19 restrictions, group is currently held via video conference.     Groups Attended: 1/12/2021, 1/19/2021, 1/26/2021, 2/2/2021, 2/3/3021, 2/4/2021.  PT also attended scheduled individual session for treatment plan review with counselor and orientation.    Support group attended this week: PT attended Gamblers Anonymous speaker in group session.     Reporting sobriety:  yes      Treatment Plan     Treatment Plan Review competed on: February 8, 2021    Staff Members contributing:  Susan Christian, CHARLETTE, [facility] & Rosy Matamoros MA, CHARLETTE, [facility]                         Received Supervision: Yes    Client: contributed to goals and plan.  Client received copy of plan/revised plan: Yes  Client agrees with plan/revised plan: Yes    Changes to Treatment Plan: No  New Goals added since last review:  No additional goals added at this time.    Goals worked on since last review:  Dimension 1 PT denies any gambling at this time.  Dimension 2 PT reports his ability to navigate the health care system independently.    Dimension 3 PT attended and participated in group discussions regarding gratitude, emotional meaning of money, and communication.  PT also attended family group.    Dimension 4 PT attended and actively participated in group and treatment processes and participated in weekly reading of virtures.  Dimension 5 PT participated in group topics understanding relapse factors, including triggers and money.   Dimension 6 PT reports he is currently self employed at this time.  PT attended family group this week and attended Gambler's Anonymous while in group setting.    Strategies effective: yes    Strategies need these changes: Identify and build on relapse prevention strategies.        Guide to Risk Ratings for  Suicidality:   IDEATION: Active thoughts of suicide? INTENT: Intent to follow on suicide? PLAN: Plan to follow through on suicide? Level of Risk:   IF Yes Yes Yes Patient = High Emergent   IF Yes Yes No Patient = High Urgent/Non-Emergent   IF Yes No No Patient = Moderate Non-Urgent   IF No No   No Patient = Low Risk   The patient's ADDITIONAL RISK FACTORS and lack of PROTECTIVE FACTORS may increase their overall suicide risk ratings.     Patient's/client's current risk rating:  Low Risk  PT denied any suicidal ideation, plans or attempts.    Family Involvement:   family refuse family week    Data:   offered feedback client did participate      Intervention:   Counselor feedback  Education  Group feedback  Relapse prevention      Assessment:   Stages of Change Model  Action    Appears/Sounds:  Cooperative    Plan:  Focus on recovery environment  Monitor emotional/physical health  Continue to work towards treatment plan goals.    CHARLETTE [place]

## 2021-02-09 ENCOUNTER — HOSPITAL ENCOUNTER (OUTPATIENT)
Dept: BEHAVIORAL HEALTH | Facility: CLINIC | Age: 61
End: 2021-02-09
Attending: FAMILY MEDICINE
Payer: COMMERCIAL

## 2021-02-09 PROCEDURE — 90853 GROUP PSYCHOTHERAPY: CPT | Mod: 95 | Performed by: COUNSELOR

## 2021-02-10 NOTE — PROGRESS NOTES
Group Therapy Documentation     Was the patient seen in-person or via Telemedicine (if in-person skip to Group Note): Telemedicine    If Telemedicine: The patient's condition can be safely assessed and treated via synchronous audio and visual telemedicine encounter. ? ?      Reason for Telemedicine Visit:? Due to Covid, only offering telehealth at this time.    Originating Site (Patient Location):  Home    Distant Site (Provider Location): Counselor off site    Consent: ?The patient/guardian has verbally consented to: the potential risks and benefits of telemedicine (video visit) versus in person care; bill my insurance or make self-payment for services provided; and responsibility for payment of non-covered services.       Mode of Communication:??Video Conference via Zoom      As the provider I attest to compliance with applicable laws and regulations related to telemedicine.        Patient attended a video group session on the following days: ?          GROUP NOTE:  DATE OF SERVICE:  February 9, 2021    START TIME:  5:30pm    END TIME:  7:30pm    FACILITATOR(S):    Rosy Matamoros MS, LADBONITA, [facility]    TOPIC: BEH Group Therapy, Problem Gambling Group     Number of patients attending the group: 9    Group Length:   120 mins    Group Therapy Type:  Problem Gambling Group    Group Attendance:  Client attended group     Summary of Group / Topics Discussed: Bean, Speaker from [place] came to present to group on his experience with gambling, recovery, and GA.  He also shared that he is in the Peer Led Phase III group at [place] and on the [facility] problem Gambling Advisory group.  He was very engaging and answered a lot of questions for the group.      Patient's response to the group topic/interactions:  Patient listened actively to the speaker.      Client specific details:   Patient seemed to be actively engaged in the speakers presentation and asked questions about the speakers recovery.

## 2021-02-11 ENCOUNTER — HOSPITAL ENCOUNTER (OUTPATIENT)
Dept: BEHAVIORAL HEALTH | Facility: CLINIC | Age: 61
End: 2021-02-11
Attending: FAMILY MEDICINE
Payer: COMMERCIAL

## 2021-02-11 PROCEDURE — 90853 GROUP PSYCHOTHERAPY: CPT | Mod: 95 | Performed by: COUNSELOR

## 2021-02-12 NOTE — PROGRESS NOTES
"Group Therapy Documentation     Was the patient seen in-person or via Telemedicine (if in-person skip to Group Note): Telemedicine    If Telemedicine: The patient's condition can be safely assessed and treated via synchronous audio and visual telemedicine encounter. ? ?      Reason for Telemedicine Visit:? Due to Covid, only offering telehealth at this time.    Originating Site (Patient Location):  Home    Distant Site (Provider Location): Counselor off site    Consent: ?The patient/guardian has verbally consented to: the potential risks and benefits of telemedicine (video visit) versus in person care; bill my insurance or make self-payment for services provided; and responsibility for payment of non-covered services.       Mode of Communication:??Video Conference via Zoom      As the provider I attest to compliance with applicable laws and regulations related to telemedicine.        Patient attended a video group session on the following days: ?          GROUP NOTE:  DATE OF SERVICE:  February 11, 2021    START TIME:  5:30pm    END TIME:  7:15pm    FACILITATOR(S):    Rosy Matamoros MS, CHARLETTE, [facility]      TOPIC: BEH Group Therapy, Problem Gambling Group     Number of patients attending the group: 7    Group Length:   105 mins    Group Therapy Type:  Problem Gambling Group    Group Attendance:  Client attended group     Summary of Group / Topics Discussed: Patients checked in with how they were doing.  New group member started this evening.  One patient Presented \"Trigger Inventory\" so group went over this assignment together and then discussed \"Setting and pursuing goals in recovery\" as many of the group still need to complete those two assignments.  Patient accepted feedback from peers and discussion was held.       Patient's response to the group topic/interactions:  Patient learned about triggers and setting goals in early recovery.      Client specific details:   Patient asked questions of group member who asked " presented his assignment.  Patient has not completed his Patient safety plan so it was quickly gone over in group and was sent via email for him to fill out.  He was told that if he needs help to set up a time with this counselor to complete the form together before or after group time.  Patient has no suicidal ideation and  Is a low risk for suicide at this time.

## 2021-02-12 NOTE — ADDENDUM NOTE
Encounter addended by: Rosy Matamoros LADC on: 2/12/2021 11:14 AM   Actions taken: Clinical Note Signed

## 2021-02-12 NOTE — PROGRESS NOTES
"Patient:  Gilbert Dawson                         Date of Assessment: 12/21/2020             Problem Gambling Progress Note and Treatment Plan Review      Attendance  Group/Individual: Phase 1 Due to Covid-19 restrictions, group is currently held via video conference.      Groups Attended: 02/09/21 & 02/11/21     Support group attended this week: PT attended Gamblers Anonymous speaker in group session.  He is considering attending Friday nights now held in [place]     Reporting sobriety:  yes        Treatment Plan      Treatment Plan Review competed on: 02/12/21     Staff Members contributing: Susan Christian, LADC, [facility] & Rosy Matamoros MA, [facility], [facility]                         Received Supervision: Yes     Client: contributed to goals and plan.  Yes  Client received copy of plan/revised plan: Yes  Client agrees with plan/revised plan: Yes     Changes to Treatment Plan: No  New Goals added since last review:  No additional goals added at this time.     Goals worked on since last review:  Dimension 1 PT denies any gambling at this time.  Dimension 2 PT reports his ability to navigate the health care system independently.    Dimension 3 PT is becoming more comfortable sharing his feeling in     Dimension 4 PT attended and actively participated in group.  Dimension 5 P Group went over \"Trigger Inventory\" and \"Setting and pursuing goals in recovery\" assignments. Patient increased their understanding of triggers and goal setting in early recovery.  Patient needs to complete those two assignments.    Dimension 6  Speaker from [place] came to present to group on his experience with gambling, recovery, and GA.  He also shared that he is in the Peer Led Phase III group at [place] and on the [facility] problem Gambling Advisory group.  Discussion on [employer] was held, in relation to so many gamblers dealing with past felony charges.     Strategies effective: yes     Strategies need these changes: Identify " and build on relapse prevention strategies.            Patient's/client's current risk rating:  Low Risk  PT denied any suicidal ideation, plans or attempts.     Family Involvement:   Patient stated he and his wife should be starting family group soon,.     Data:   offered feedback client did participate     Intervention:   Education  Group feedback  Relapse prevention     Assessment:   Stages of Change Model  Action     Appears/Sounds:  Cooperative     Plan:  Focus on recovery environment  Monitor emotional/physical health  Continue to work towards treatment plan goals.

## 2021-03-03 ENCOUNTER — TELEPHONE (OUTPATIENT)
Dept: BEHAVIORAL HEALTH | Facility: CLINIC | Age: 61
End: 2021-03-03

## 2021-03-10 NOTE — DISCHARGE SUMMARY
PROBLEM GAMBLING DISCHARGE SUMMARY     PATIENT NAME:  Gilbert Dawson  MRN #: [id number]  :  [birth year]     TREATMENT COUNSELING TEAM: Rosy Matamoros MS, [facility], [facility] & Susan Christian, CHARLETTE, [facility]     PROGRAM:  [facility]     ADMISSION DATE: 20  DATE OF LAST SESSION: 21  DISCHARGE DATE: 21     ADMISSION DIAGNOSIS:  Compulsive Gambling 312.21     HOURS OF TREATMENT COMPLETED:  Patient attended 9 group sessions of Phase I.     DISCHARGE STATUS: Patient came in for a court ordered gambling eval due to a theft by chula back in .  Due to his probation being up in 2021, he decided he didn't need to attend the program anymore and stopped attending.  This writer reached out to the patient to see if he was returning to group and patient asked to be discharged from programming.     PRESENTING INFORMATION:  Rule 82, Court order, probation up in 2021, must do an eval and follow recommendations.  Theft by chula, construction remodeling, not completed, served 45 days, owed $18,000 restitution 2014.  Stole $18,000 over  - .       SERVICES PROVIDED:  Our services included assessment, treatment planning and education regarding addiction, mental illness, relationships, and relapse prevention.  The patient also participated in group therapy, and family group therapy.     ISSUES ADDRESS IN TREATMENT:     DIMENSION 1 - ACUTE INTOXICATION/WITHDRAWAL POTENTIAL  ADMISSION RISK RATIN  DISCHARGE RISK RATIN  PT denies any substance use while in programming.  No issues to address at the time of discharge.  PT did report continued gambling behaviors while in programming.     DIMENSION 2 - BIOMEDICAL COMPLICATIONS AND CONDITIONS  ADMISSION RISK RATIN  DISCHARGE RISK RATIN  Upon admission, Patient reported health conditions, patient had a heart attack and a triple bypass, reporting treatment from a primary care provider.   PT denied any biomedical issues or concerns that would interfere with full participation in group and treatment programming.  PT reported he was prescribed medications and reported medication compliance.  PT reported he continued to work with medication providers.  PT reported having a primary care provider in the community and reported his ability to navigate the health care system independently.      DIMENSION 3 - EMOTIONAL, BEHAVIORAL, COGNITIVE CONDITIONS AND COMPLICATIONS  ADMISSION RISK RATIN  DISCHARGE RISK RATIN  PT's suicide risk rating at admission was determined to be at a very low risk and also at discharge.  PT denied any suicidal ideations, plans or attempts while in programming.  PT completed a safety plan.   Patient denied a formal mental health diagnosis at intake or throughout treatment.     DIMENSION 4 - READINESS FOR CHANGE  ADMISSION RISK RATIN  DISCHARGE RISK RATIN  Upon admission, PT verbalized his willingness to follow treatment recommendations, in addition to his own awareness and motivation to remain gambling fee.  Patient completed some assignments that were completed during the group.  As soon as his probation was closed he felt he did not need to attend programming and asked to be discharged.     DIMENSION 5 - RELAPSE, CONTINUED USE AND CONTINUED PROBLEM POTENTIAL   ADMISSION RISK RATING: 3  DISCHARGE RISK RATING: 3  Upon admission, PT expresses his desire to remain abstinent  from gambling.  PT reported awareness into triggers and relapse factors.  Patient did complete some assignments that will increase his awareness of relapse potential and how the gambling impacted him over his lifetime.     DIMENSION 6 - RECOVERY ENVIRONMENT  ADMISSION RISK RATIN  DISCHARGE RISK RATIN  Patient currently lives with his wife and dog.  Patient has retired.  Patient probation has ended and he is no longer monitored by the courts.      PROGNOSIS:  Prognosis for this patient is  undetermined at this time.  Patient has a history of gambling and stealing to support his gambling.  Patient has not completed treatment for his gambling addiction.

## 2021-03-10 NOTE — ADDENDUM NOTE
Encounter addended by: Rosy Matamoros LADC on: 3/10/2021 12:17 PM   Actions taken: Pend clinical note

## 2023-03-14 NOTE — Clinical Note
Please take medications as prescribed. Salt water gargles and tea with honey may help soothe throat. Discard the toothbrush after being on the antibiotics for 24 hours. Follow up with primary care provider. left ventricle Cine(s)  injected utilizing power injector system.

## 2024-01-01 NOTE — PROVIDER NOTIFICATION
"Notified Dr. Mayes of pt c/o L shoulder pain.  Pt states \"my shoulder locks up and usually I just stand up and position it a certain way to help it.  It's bone on bone.\"  Pt denies shortness of breath, denies chest or jaw pain.  No rhythm changes noted and pt BPs stable.  Dr. Mayes ordered 5 mg flexeril.    "
Notified Dr. Mayes of pt continuing to c/o of lower back pain and spasm.  Pt reports positioning and massage help the low back pain, but it is now faily constant and difficult to find comfortable position.  IABP site intact and pt denies dizziness, nausea, or other concerns.  Dr. Mayes ordered 1 mg valium IV for muscle spasm.    
Patient requests all Lab, Cardiology, and Radiology Results on their Discharge Instructions

## 2025-09-02 ENCOUNTER — HOSPITAL ENCOUNTER (EMERGENCY)
Facility: CLINIC | Age: 65
Discharge: HOME | End: 2025-09-02
Attending: STUDENT IN AN ORGANIZED HEALTH CARE EDUCATION/TRAINING PROGRAM | Admitting: STUDENT IN AN ORGANIZED HEALTH CARE EDUCATION/TRAINING PROGRAM
Payer: COMMERCIAL

## 2025-09-02 VITALS
BODY MASS INDEX: 31.38 KG/M2 | SYSTOLIC BLOOD PRESSURE: 124 MMHG | WEIGHT: 236.77 LBS | RESPIRATION RATE: 16 BRPM | HEART RATE: 84 BPM | DIASTOLIC BLOOD PRESSURE: 100 MMHG | TEMPERATURE: 97.7 F | OXYGEN SATURATION: 97 % | HEIGHT: 73 IN

## 2025-09-02 DIAGNOSIS — R68.84 JAW PAIN: Primary | ICD-10-CM

## 2025-09-02 DIAGNOSIS — I25.10 CORONARY ARTERY DISEASE INVOLVING NATIVE CORONARY ARTERY OF NATIVE HEART WITHOUT ANGINA PECTORIS: ICD-10-CM

## 2025-09-02 DIAGNOSIS — I45.10 RIGHT BUNDLE BRANCH BLOCK: Chronic | ICD-10-CM

## 2025-09-02 DIAGNOSIS — I10 ESSENTIAL HYPERTENSION: ICD-10-CM

## 2025-09-02 PROBLEM — I24.0: Status: ACTIVE | Noted: 2019-08-12

## 2025-09-02 PROBLEM — E66.3 OVERWEIGHT (BMI 25.0-29.9): Status: ACTIVE | Noted: 2024-09-19

## 2025-09-02 PROBLEM — Z95.810 IMPLANTABLE CARDIOVERTER-DEFIBRILLATOR (ICD) IN SITU: Status: ACTIVE | Noted: 2021-03-26

## 2025-09-02 PROBLEM — G47.33 SEVERE OBSTRUCTIVE SLEEP APNEA: Status: ACTIVE | Noted: 2021-11-29

## 2025-09-02 PROBLEM — M62.08 DIASTASIS OF RECTUS ABDOMINIS: Status: ACTIVE | Noted: 2024-09-19

## 2025-09-02 PROBLEM — I48.0 PAROXYSMAL ATRIAL FIBRILLATION (H): Status: ACTIVE | Noted: 2024-05-09

## 2025-09-02 PROBLEM — I21.11 ST ELEVATION MYOCARDIAL INFARCTION INVOLVING RIGHT CORONARY ARTERY (H): Status: ACTIVE | Noted: 2019-08-12

## 2025-09-02 PROBLEM — I50.22 CHRONIC SYSTOLIC HEART FAILURE (H): Status: ACTIVE | Noted: 2024-09-19

## 2025-09-02 PROBLEM — R73.03 PRE-DIABETES: Status: ACTIVE | Noted: 2020-12-09

## 2025-09-02 PROBLEM — I25.5 ISCHEMIC CARDIOMYOPATHY: Status: ACTIVE | Noted: 2019-08-12

## 2025-09-02 PROBLEM — I48.92 ATRIAL FLUTTER (H): Status: ACTIVE | Noted: 2024-05-09

## 2025-09-02 PROBLEM — Z79.01 LONG TERM (CURRENT) USE OF ANTICOAGULANTS: Status: ACTIVE | Noted: 2020-01-29

## 2025-09-02 PROBLEM — Z82.49 FAMILY HISTORY OF ABDOMINAL AORTIC ANEURYSM (AAA): Status: ACTIVE | Noted: 2019-08-20

## 2025-09-02 LAB
ANION GAP SERPL CALCULATED.3IONS-SCNC: 11 MMOL/L (ref 7–15)
ATRIAL RATE - MUSE: 86 BPM
BUN SERPL-MCNC: 23.5 MG/DL (ref 8–23)
CALCIUM SERPL-MCNC: 9 MG/DL (ref 8.8–10.4)
CHLORIDE SERPL-SCNC: 104 MMOL/L (ref 98–107)
CREAT SERPL-MCNC: 0.99 MG/DL (ref 0.67–1.17)
DIASTOLIC BLOOD PRESSURE - MUSE: NORMAL MMHG
EGFRCR SERPLBLD CKD-EPI 2021: 84 ML/MIN/1.73M2
ERYTHROCYTE [DISTWIDTH] IN BLOOD BY AUTOMATED COUNT: 12.5 % (ref 10–15)
GLUCOSE SERPL-MCNC: 101 MG/DL (ref 70–99)
HCO3 SERPL-SCNC: 27 MMOL/L (ref 22–29)
HCT VFR BLD AUTO: 43.4 % (ref 40–53)
HGB BLD-MCNC: 15.3 G/DL (ref 13.3–17.7)
HOLD SPECIMEN: NORMAL
HOLD SPECIMEN: NORMAL
INTERPRETATION ECG - MUSE: NORMAL
MCH RBC QN AUTO: 32.1 PG (ref 26.5–33)
MCHC RBC AUTO-ENTMCNC: 35.3 G/DL (ref 31.5–36.5)
MCV RBC AUTO: 91 FL (ref 78–100)
P AXIS - MUSE: 75 DEGREES
PLATELET # BLD AUTO: 239 10E3/UL (ref 150–450)
POTASSIUM SERPL-SCNC: 4.6 MMOL/L (ref 3.4–5.3)
PR INTERVAL - MUSE: 224 MS
QRS DURATION - MUSE: 112 MS
QT - MUSE: 372 MS
QTC - MUSE: 445 MS
R AXIS - MUSE: -28 DEGREES
RBC # BLD AUTO: 4.77 10E6/UL (ref 4.4–5.9)
SODIUM SERPL-SCNC: 142 MMOL/L (ref 135–145)
SYSTOLIC BLOOD PRESSURE - MUSE: NORMAL MMHG
T AXIS - MUSE: -7 DEGREES
TROPONIN T SERPL HS-MCNC: 8 NG/L
TROPONIN T SERPL HS-MCNC: 8 NG/L
VENTRICULAR RATE- MUSE: 86 BPM
WBC # BLD AUTO: 6.7 10E3/UL (ref 4–11)

## 2025-09-02 PROCEDURE — 84484 ASSAY OF TROPONIN QUANT: CPT | Performed by: STUDENT IN AN ORGANIZED HEALTH CARE EDUCATION/TRAINING PROGRAM

## 2025-09-02 PROCEDURE — 36415 COLL VENOUS BLD VENIPUNCTURE: CPT | Performed by: STUDENT IN AN ORGANIZED HEALTH CARE EDUCATION/TRAINING PROGRAM

## 2025-09-02 PROCEDURE — 99284 EMERGENCY DEPT VISIT MOD MDM: CPT | Performed by: STUDENT IN AN ORGANIZED HEALTH CARE EDUCATION/TRAINING PROGRAM

## 2025-09-02 PROCEDURE — 85018 HEMOGLOBIN: CPT | Performed by: STUDENT IN AN ORGANIZED HEALTH CARE EDUCATION/TRAINING PROGRAM

## 2025-09-02 PROCEDURE — 82310 ASSAY OF CALCIUM: CPT | Performed by: STUDENT IN AN ORGANIZED HEALTH CARE EDUCATION/TRAINING PROGRAM

## 2025-09-02 PROCEDURE — 93005 ELECTROCARDIOGRAM TRACING: CPT

## 2025-09-02 ASSESSMENT — ACTIVITIES OF DAILY LIVING (ADL)
ADLS_ACUITY_SCORE: 43

## 2025-09-02 ASSESSMENT — COLUMBIA-SUICIDE SEVERITY RATING SCALE - C-SSRS
2. HAVE YOU ACTUALLY HAD ANY THOUGHTS OF KILLING YOURSELF IN THE PAST MONTH?: NO
6. HAVE YOU EVER DONE ANYTHING, STARTED TO DO ANYTHING, OR PREPARED TO DO ANYTHING TO END YOUR LIFE?: NO
1. IN THE PAST MONTH, HAVE YOU WISHED YOU WERE DEAD OR WISHED YOU COULD GO TO SLEEP AND NOT WAKE UP?: NO

## (undated) DEVICE — DEFIB PRO-PADZ LVP LQD GEL ADULT 8900-2105-01

## (undated) DEVICE — CATH ANGIO INFINITI 3DRC 6FRX100CM 534676T

## (undated) DEVICE — CATH DIAG 4FR ANG PIG 538453S

## (undated) DEVICE — CATH GUIDELINER 6FR 5571

## (undated) DEVICE — INFL DVC KIT W/10CC NITRO IN4530

## (undated) DEVICE — CATH BALLOON EMERGE 2.5X15MM H7493918915250

## (undated) DEVICE — CATH BALLOON IABP 7.5FRX50ML INTRA-AORTIC 0684-00-0576-01U

## (undated) DEVICE — CATH ANGIO JUDKINS JL4 6FRX100CM INFINITI 534620T

## (undated) DEVICE — INTRO SHEATH 4FRX10CM PINNACLE RSS402

## (undated) DEVICE — INTRODUCER SHEATH GREEN 6.5FRX11CM .038IN PSI-6F-11-038ACT

## (undated) DEVICE — GUIDEWIRE VASC 0.014INX190CM J TIP CGRXT190HJ

## (undated) DEVICE — CATH LAUNCHER 6FR JR 4.0 LA6JR40

## (undated) DEVICE — CATH ANGIO INFINITI MPA2 4FRX100CM 2 SH 538442

## (undated) DEVICE — CATH ANGIO INFINITI 3DRC 4FRX100CM 538476

## (undated) DEVICE — CATH BALLOON NC EUPHORA 2.75X15MM NCEUP27515X

## (undated) DEVICE — KIT HAND CONTROL ANGIOTOUCH ACIST 65CM AT-P65

## (undated) DEVICE — CATH DIAG 4FR AR 1 MOD 538441

## (undated) DEVICE — KIT CATHETER INDIGO RX 140CM WITH LG LUMEN ASP TUBING

## (undated) DEVICE — NDL PERC ENTRY THINWALL 18GA 7.0" G00166

## (undated) DEVICE — TOTE ANGIO CORP PC15AT SAN32CC83O

## (undated) DEVICE — CANISTER ENGINE FOR INDIGO SYSTEM

## (undated) RX ORDER — LIDOCAINE HYDROCHLORIDE 10 MG/ML
INJECTION, SOLUTION EPIDURAL; INFILTRATION; INTRACAUDAL; PERINEURAL
Status: DISPENSED
Start: 2019-08-07

## (undated) RX ORDER — ATROPINE SULFATE 0.1 MG/ML
INJECTION INTRAVENOUS
Status: DISPENSED
Start: 2019-08-07

## (undated) RX ORDER — HEPARIN SODIUM 1000 [USP'U]/ML
INJECTION, SOLUTION INTRAVENOUS; SUBCUTANEOUS
Status: DISPENSED
Start: 2019-08-07

## (undated) RX ORDER — FENTANYL CITRATE 50 UG/ML
INJECTION, SOLUTION INTRAMUSCULAR; INTRAVENOUS
Status: DISPENSED
Start: 2019-08-07